# Patient Record
Sex: MALE | Race: WHITE | Employment: FULL TIME | URBAN - NONMETROPOLITAN AREA
[De-identification: names, ages, dates, MRNs, and addresses within clinical notes are randomized per-mention and may not be internally consistent; named-entity substitution may affect disease eponyms.]

---

## 2020-11-27 ENCOUNTER — OFFICE VISIT (OUTPATIENT)
Dept: PRIMARY CARE CLINIC | Age: 35
End: 2020-11-27
Payer: COMMERCIAL

## 2020-11-27 VITALS
TEMPERATURE: 97.9 F | WEIGHT: 220 LBS | DIASTOLIC BLOOD PRESSURE: 80 MMHG | BODY MASS INDEX: 32.58 KG/M2 | HEIGHT: 69 IN | RESPIRATION RATE: 18 BRPM | SYSTOLIC BLOOD PRESSURE: 120 MMHG | OXYGEN SATURATION: 97 % | HEART RATE: 100 BPM

## 2020-11-27 DIAGNOSIS — Z20.822 EXPOSURE TO COVID-19 VIRUS: ICD-10-CM

## 2020-11-27 PROCEDURE — 99214 OFFICE O/P EST MOD 30 MIN: CPT | Performed by: PHYSICIAN ASSISTANT

## 2020-11-27 RX ORDER — PREDNISONE 20 MG/1
40 TABLET ORAL DAILY
Qty: 10 TABLET | Refills: 0 | Status: SHIPPED | OUTPATIENT
Start: 2020-11-27 | End: 2020-12-02

## 2020-11-27 RX ORDER — BENZONATATE 100 MG/1
100 CAPSULE ORAL 3 TIMES DAILY PRN
Qty: 21 CAPSULE | Refills: 0 | Status: SHIPPED | OUTPATIENT
Start: 2020-11-27 | End: 2020-12-04

## 2020-11-27 RX ORDER — AZITHROMYCIN 250 MG/1
250 TABLET, FILM COATED ORAL SEE ADMIN INSTRUCTIONS
Qty: 6 TABLET | Refills: 0 | Status: SHIPPED | OUTPATIENT
Start: 2020-11-27 | End: 2020-12-02

## 2020-11-27 RX ORDER — CHLORPHENIRAMINE MALEATE 4 MG/1
4 TABLET ORAL EVERY 6 HOURS PRN
Qty: 20 TABLET | Refills: 0 | Status: SHIPPED
Start: 2020-11-27 | End: 2022-02-28

## 2020-11-27 NOTE — PROGRESS NOTES
20  Micaela Jefefry : 1985 Sex: male  Age 38 y.o. Subjective:  Chief Complaint   Patient presents with    Cough     cough, previous test unable to determine          HPI:   Micaela Jeffery , 38 y.o. male presents to express care for evaluation of cough, congestion, drainage. The patient has had a recent test on Monday at [facility] in [facility]. The patient states that it came back indeterminant. Patient still having cough, congestion. The patient started with the symptoms last Saturday. He said any back pain or flank pain. The patient is eating and drinking normally. The patient did lose his sense of taste that seems to be coming back at this point. ROS:   Unless otherwise stated in this report the patient's positive and negative responses for review of systems for constitutional, eyes, ENT, cardiovascular, respiratory, gastrointestinal, neurological, , musculoskeletal, and integument systems and related systems to the presenting problem are either stated in the history of present illness or were not pertinent or were negative for the symptoms and/or complaints related to the presenting medical problem. Positives and pertinent negatives as per HPI. All others reviewed and are negative. PMH:   No past medical history on file. Past Surgical History:   Procedure Laterality Date    BACK SURGERY         No family history on file. Medications:   No current outpatient medications on file. Allergies:   No Known Allergies    Social History:     Social History     Tobacco Use    Smoking status: Current Every Day Smoker     Packs/day: 0.50    Smokeless tobacco: Never Used   Substance Use Topics    Alcohol use: No    Drug use: No       Patient lives at home.     Physical Exam:     Vitals:    20 1017   BP: 120/80   Pulse: 100   Resp: 18   Temp: 97.9 °F (36.6 °C)   SpO2: 97%   Weight: 220 lb (99.8 kg)   Height: 5' 9\" (1.753 m)       Exam:  Physical Exam  Nurse's notes and vital signs reviewed. The patient is not hypoxic. ? General: Alert, no acute distress, patient resting comfortably Patient is not toxic or lethargic. Skin: Warm, intact, no pallor noted. There is no evidence of rash at this time. Head: Normocephalic, atraumatic  Eye: Normal conjunctiva  Ears, Nose, Throat: Right tympanic membrane clear, left tympanic membrane clear. No drainage or discharge noted. No pre- or post-auricular tenderness, erythema, or swelling noted. Mild congestion  Posterior oropharynx shows no erythema, tonsillar hypertrophy, or exudate. the uvula is midline. No trismus or drooling is noted. Moist mucous membranes. Neck: No anterior/posterior lymphadenopathy noted. No erythema, no masses, no fluctuance or induration noted. No meningeal signs. Cardiovascular: Regular Rate and Rhythm  Respiratory: No acute distress, dry cough throughout examination, no rhonchi, wheezing or crackles noted. No stridor or retractions are noted. Neurological: A&O x4, normal speech  Psychiatric: Cooperative         Testing:       Repeat Covid testing being performed    Medical Decision Making:     The patient has been seen and evaluated. The vital signs have been reviewed. The patient does not appear to be toxic or lethargic. The patient will have repeat Covid test performed. We will start the patient on a azithromycin, Tessalon, chlorphentermine and prednisone. Will the patient follow-up with PCP. Call with any questions or concerns. The patient was educated on the proper dosage of motrin and tylenol and the appropriate intervals of each. The patient is to increase fluid intake over the next several days. The patient is to use OTC decongestant as needed. The patient is to return to express care or go directly to the emergency department should any of the signs or symptoms worsen. The patient is to followup with primary care physician in 2-3 days for repeat evaluation.  The

## 2020-11-29 LAB
SARS-COV-2: NOT DETECTED
SOURCE: NORMAL

## 2021-03-02 ENCOUNTER — HOSPITAL ENCOUNTER (OUTPATIENT)
Age: 36
Discharge: HOME | End: 2021-03-04

## 2021-03-02 ENCOUNTER — HOSPITAL ENCOUNTER (OUTPATIENT)
Dept: GENERAL RADIOLOGY | Age: 36
Discharge: HOME | End: 2021-03-04

## 2021-03-02 DIAGNOSIS — R06.02 SOB (SHORTNESS OF BREATH): ICD-10-CM

## 2021-03-02 LAB
BASOPHILS ABSOLUTE: 0.04 E9/L (ref 0–0.2)
BASOPHILS RELATIVE PERCENT: 0.7 % (ref 0–2)
BILIRUBIN URINE: NEGATIVE
BLOOD, URINE: NEGATIVE
CLARITY: CLEAR
COLOR: YELLOW
EOSINOPHILS ABSOLUTE: 0.08 E9/L (ref 0.05–0.5)
EOSINOPHILS RELATIVE PERCENT: 1.5 % (ref 0–6)
GLUCOSE URINE: NEGATIVE MG/DL
HCT VFR BLD CALC: 51.8 % (ref 37–54)
HEMOGLOBIN: 17 G/DL (ref 12.5–16.5)
IMMATURE GRANULOCYTES #: 0.02 E9/L
IMMATURE GRANULOCYTES %: 0.4 % (ref 0–5)
KETONES, URINE: NEGATIVE MG/DL
LEUKOCYTE ESTERASE, URINE: NEGATIVE
LYMPHOCYTES ABSOLUTE: 1.63 E9/L (ref 1.5–4)
LYMPHOCYTES RELATIVE PERCENT: 29.7 % (ref 20–42)
MCH RBC QN AUTO: 28.8 PG (ref 26–35)
MCHC RBC AUTO-ENTMCNC: 32.8 % (ref 32–34.5)
MCV RBC AUTO: 87.8 FL (ref 80–99.9)
MONOCYTES ABSOLUTE: 0.36 E9/L (ref 0.1–0.95)
MONOCYTES RELATIVE PERCENT: 6.6 % (ref 2–12)
NEUTROPHILS ABSOLUTE: 3.36 E9/L (ref 1.8–7.3)
NEUTROPHILS RELATIVE PERCENT: 61.1 % (ref 43–80)
NITRITE, URINE: NEGATIVE
PDW BLD-RTO: 11.9 FL (ref 11.5–15)
PH UA: 6 (ref 5–9)
PLATELET # BLD: 247 E9/L (ref 130–450)
PMV BLD AUTO: 10.3 FL (ref 7–12)
PROTEIN UA: NEGATIVE MG/DL
RBC # BLD: 5.9 E12/L (ref 3.8–5.8)
SPECIFIC GRAVITY UA: 1.02 (ref 1–1.03)
UROBILINOGEN, URINE: 0.2 E.U./DL
WBC # BLD: 5.5 E9/L (ref 4.5–11.5)

## 2021-03-02 PROCEDURE — 71046 X-RAY EXAM CHEST 2 VIEWS: CPT

## 2022-01-20 LAB
ALBUMIN SERPL-MCNC: 4.6 G/DL
ALP BLD-CCNC: 165 U/L
ALT SERPL-CCNC: 151 U/L
ANION GAP SERPL CALCULATED.3IONS-SCNC: NORMAL MMOL/L
AST SERPL-CCNC: 63 U/L
AVERAGE GLUCOSE: NORMAL
BASOPHILS ABSOLUTE: 31 /ΜL
BASOPHILS RELATIVE PERCENT: 0.6 %
BILIRUB SERPL-MCNC: 0.8 MG/DL (ref 0.1–1.4)
BUN BLDV-MCNC: 16 MG/DL
CALCIUM SERPL-MCNC: 9.7 MG/DL
CHLORIDE BLD-SCNC: 102 MMOL/L
CHOLESTEROL, TOTAL: 268 MG/DL
CHOLESTEROL/HDL RATIO: 4.3
CO2: 27 MMOL/L
CREAT SERPL-MCNC: 1.02 MG/DL
EOSINOPHILS ABSOLUTE: 92 /ΜL
EOSINOPHILS RELATIVE PERCENT: 1.8 %
GFR CALCULATED: NORMAL
GLUCOSE BLD-MCNC: 93 MG/DL
HBA1C MFR BLD: 6 %
HCT VFR BLD CALC: 48.2 % (ref 41–53)
HDLC SERPL-MCNC: 63 MG/DL (ref 35–70)
HEMOGLOBIN: 16.2 G/DL (ref 13.5–17.5)
LDL CHOLESTEROL CALCULATED: 182 MG/DL (ref 0–160)
LYMPHOCYTES ABSOLUTE: 1805 /ΜL
LYMPHOCYTES RELATIVE PERCENT: 35.4 %
MCH RBC QN AUTO: 29.2 PG
MCHC RBC AUTO-ENTMCNC: 33.6 G/DL
MCV RBC AUTO: 86.8 FL
MONOCYTES ABSOLUTE: 398 /ΜL
MONOCYTES RELATIVE PERCENT: 7.8 %
NEUTROPHILS ABSOLUTE: 2774 /ΜL
NEUTROPHILS RELATIVE PERCENT: 54.4 %
NONHDLC SERPL-MCNC: ABNORMAL MG/DL
PLATELET # BLD: 212 K/ΜL
PMV BLD AUTO: 10.6 FL
POTASSIUM SERPL-SCNC: 4.4 MMOL/L
RBC # BLD: 5.55 10^6/ΜL
SODIUM BLD-SCNC: 136 MMOL/L
TOTAL PROTEIN: 7.2
TRIGL SERPL-MCNC: 105 MG/DL
VLDLC SERPL CALC-MCNC: ABNORMAL MG/DL
WBC # BLD: 5.1 10^3/ML

## 2022-02-28 ENCOUNTER — OFFICE VISIT (OUTPATIENT)
Dept: PRIMARY CARE CLINIC | Age: 37
End: 2022-02-28
Payer: COMMERCIAL

## 2022-02-28 VITALS
HEART RATE: 67 BPM | HEIGHT: 69 IN | DIASTOLIC BLOOD PRESSURE: 70 MMHG | OXYGEN SATURATION: 98 % | SYSTOLIC BLOOD PRESSURE: 134 MMHG | TEMPERATURE: 97.8 F | BODY MASS INDEX: 29.92 KG/M2 | WEIGHT: 202 LBS

## 2022-02-28 DIAGNOSIS — Z11.4 ENCOUNTER FOR SCREENING FOR HIV: ICD-10-CM

## 2022-02-28 DIAGNOSIS — R79.89 ELEVATED LFTS: Primary | ICD-10-CM

## 2022-02-28 DIAGNOSIS — Z00.00 HEALTH MAINTENANCE EXAMINATION: ICD-10-CM

## 2022-02-28 DIAGNOSIS — R82.89 ABNORMAL URINE CYTOLOGY: ICD-10-CM

## 2022-02-28 DIAGNOSIS — E78.2 MIXED HYPERLIPIDEMIA: ICD-10-CM

## 2022-02-28 DIAGNOSIS — R73.03 PREDIABETES: ICD-10-CM

## 2022-02-28 DIAGNOSIS — D41.02 NEOPLASM OF UNCERTAIN BEHAVIOR OF LEFT KIDNEY: ICD-10-CM

## 2022-02-28 PROCEDURE — 99215 OFFICE O/P EST HI 40 MIN: CPT | Performed by: FAMILY MEDICINE

## 2022-02-28 SDOH — ECONOMIC STABILITY: FOOD INSECURITY: WITHIN THE PAST 12 MONTHS, YOU WORRIED THAT YOUR FOOD WOULD RUN OUT BEFORE YOU GOT MONEY TO BUY MORE.: NEVER TRUE

## 2022-02-28 SDOH — ECONOMIC STABILITY: FOOD INSECURITY: WITHIN THE PAST 12 MONTHS, THE FOOD YOU BOUGHT JUST DIDN'T LAST AND YOU DIDN'T HAVE MONEY TO GET MORE.: NEVER TRUE

## 2022-02-28 ASSESSMENT — PATIENT HEALTH QUESTIONNAIRE - PHQ9
2. FEELING DOWN, DEPRESSED OR HOPELESS: 0
1. LITTLE INTEREST OR PLEASURE IN DOING THINGS: 0
SUM OF ALL RESPONSES TO PHQ QUESTIONS 1-9: 0
SUM OF ALL RESPONSES TO PHQ9 QUESTIONS 1 & 2: 0
SUM OF ALL RESPONSES TO PHQ QUESTIONS 1-9: 0

## 2022-02-28 ASSESSMENT — SOCIAL DETERMINANTS OF HEALTH (SDOH): HOW HARD IS IT FOR YOU TO PAY FOR THE VERY BASICS LIKE FOOD, HOUSING, MEDICAL CARE, AND HEATING?: NOT HARD AT ALL

## 2022-02-28 NOTE — PROGRESS NOTES
Imelda Pro : 1985 Sex: male  Age: 43 y.o. Chief Complaint   Patient presents with    Discuss Labs       HPI  HPI:    Patient presents today as he received some abnormal results from a work physical, they checked a chest x-ray blood work and urine cytology. Results as below. He is trying to lose  Weight, lost 25 lbs over a year (at new company), all explained he states. Feels very well. Family history of prostate cancer in maternal grandfather later in life but no other history of urinary tract cancers  Had recent nL CXR, elevated chol, lft, a1c, atypical urothelia cells,     [id number], HDL 63, , , AST 63, , A1C 6.0, , CBC nL, UA NEG, , TOTAL IRON 156, atypical urothelial cells        Review of Systems  ROS:  Const: Denies chills, fever, malaise and sweats. Eyes: Denies discharge, pain, redness and visual disturbance. ENMT: Denies earaches, other ear symptoms. Denies nasal or sinus symptoms other than stated  above. Denies mouth and tongue lesions and sore throat. CV: Denies chest discomfort, pain; diaphoresis, dizziness, edema, lightheadedness, orthopnea,  palpitations, syncope and near syncopal episode or any exertional symptoms  Resp: Denies cough, hemoptysis, pleuritic pain, SOB, sputum production and wheezing. GI: Denies abdominal pain, change in bowel habits, hematochezia, melena, nausea and vomiting. : Denies urinary symptoms including dysuria , urgency, frequency or hematuria. Musculo: Denies musculoskeletal symptoms. Skin: Denies bruising and rash. Neuro: Denies headache, numbness, stiff neck, tingling and focal weakness slurred speech or facial  droop  Hema/Lymph: Denies bleeding/bruising tendency and enlarged lymph nodes      No current outpatient medications on file.   No Known Allergies    Past Medical History:   Diagnosis Date    Patient denies medical problems      Past Surgical History:   Procedure Laterality Date    BACK SURGERY   Discectomy; Dr Winsome Wen     Family History   Problem Relation Age of Onset    Prostate Cancer Maternal Grandfather          age 80, dx 80's    Leukemia Paternal Grandfather         around age [de-identified]     Social History     Tobacco Use    Smoking status: Former Smoker     Packs/day: 0.50     Start date:      Quit date: 2017     Years since quittin.1    Smokeless tobacco: Never Used   Substance Use Topics    Alcohol use: No    Drug use: No      Social History     Social History Narrative     [employer]    (Gets yrly cxr, bw, occ pfts)          - works [employer]    1 daughter age 3 as of         Vitals:    22 0812   BP: 134/70   Pulse: 67   Temp: 97.8 °F (36.6 °C)   SpO2: 98%   Weight: 202 lb (91.6 kg)   Height: 5' 9\" (1.753 m)      Wt Readings from Last 3 Encounters:   22 202 lb (91.6 kg)   20 220 lb (99.8 kg)        Physical Exam  Exam:  Const: Appears comfortable. No signs of acute distress present. Head/Face: Atraumatic on inspection. Eyes: EOMI in both eyes. No discharge from the eyes. PERRL. Sclerae clear. ENMT: Auditory canals normal. Tympanic membranes: intact and translucent. External nose WNL. Nasal mucosa is clear. Oropharynx: No erythema or exudate. Posterior pharynx is normal.  Neck: Supple. Palpation reveals no adenopathy. No masses appreciated. No JVD. Carotids: no  bruits. Resp: Respirations are unlabored. Clear to auscultation. No rales, rhonchi or wheezes appreciated  over the lungs bilaterally. CV: Rate is regular. Rhythm is regular. No gallop or rubs. No heart murmur appreciated. Extremities: No clubbing, cyanosis, or edema. No calf inflammation or tenderness. Abdomen: Bowel sounds are normoactive. Abdomen is soft, nontender, and nondistended. No  abdominal masses. No palpable hepatosplenomegaly. Lymph: No palpable or visible regional lymphadenopathy. Musculoskeletal: no acute joint inflammation.   Skin: Dry and warm with no rash. Skin normal to inspection and palpation overall. Neuro: Alert and oriented. Affect: appropriate. Upper Extremities: 5/5 bilaterally. Lower Extremities:  5/5 bilaterally. Sensation intact to light touch. Reflexes: DTR's are symmetric and 2+ bilaterally. .  Cranial Nerves: Cranial nerves grossly intact. Office Labs This Visit :  No results found for this visit on 02/28/22. Assessment and Plan:   Diagnosis Orders   1. Elevated LFTs  US ABDOMEN COMPLETE    Hepatitis C Antibody    Hepatitis B Surface Antigen    Hepatitis B Surface Antibody    Hepatitis A Antibody, Total    Hepatitis A Antibody, IgM    Comprehensive Metabolic Panel    TSH   2. Prediabetes  Hemoglobin A1C    Comprehensive Metabolic Panel    Microalbumin / Creatinine Urine Ratio   3. Abnormal urine cytology  US ABDOMEN COMPLETE    Urinalysis    Cytology, Non-Gyn    Amb External Referral To Urology   4. Health maintenance examination     5. Encounter for screening for HIV  HIV Screen   6. Mixed hyperlipidemia  Lipid Panel    TSH       Elevated LFTs    Counseled extensively. Differential reviewed, including serious etiologies. Consider fatty liver. Precautions reviewed. Risks of even fatty liverreviewed. Lifestyle modification and appropriate diet and weight loss reviewed. Asymptomatic. Start with repeat blood work, hepatitis screen and ultrasound. Lifestyle modification. May need CT/referral    Prediabetes    counseled, consider insulin sensitizer, lifestyle modification, repeat    Abnormal urine cytology    Counseled extensively. Differential reviewed, including serious etiologies. Prior history of smoking and history of benzene exposure but no family history or other risk factors, asymptomatic and young age. No microscopic blood on urinalysis or gross hematuria. Consider false positive but need to thoroughly evaluate. Start with ultrasound and referral to Dr. Cathie Cardozo with repeat urine cytology/urinalysis. May need CTU/cystoscopy and counseled, Notify of symptoms    Health maintenance examination    encouraged    Mixed hyperlipidemia    counseled, consider statin but we have to assess elevated LFTs first.  Lifestyle modification reviewed. Recheck         No flowsheet data found. Plan as above. Counseled extensively and differential diagnoses relevant to above were reviewed, including serious etiologies. Side effects and interactions of medications were reviewed. Check ultrasound, repeat blood work, lifestyle modification, refer to Dr. Angelica Thomason, consider CT. Follow-up 1 to 2 weeks sooner as needed        As long as symptoms steadily improve/resolve, and medical conditions follow the expected course, FU as below, sooner PRN. Return for 1-2 wks, physical.       Over 40 minutes  spent with the patient in reviewing records, reviewing with patient/family, counseling, ordering,  prescribing, completing h&p, etc., with over 50% of the time spent face to face counseling. Signs and symptoms to watch for discussed, serious signs and symptoms reviewed. ER if any. Ursula Bustamante MD    Patients are advised to check with insurance company to ensure coverage and to fully understand benefits and cost prior to any testing. This note was created with the assistance of voice recognition software. Document was reviewed however may contain grammatical errors.

## 2022-02-28 NOTE — ASSESSMENT & PLAN NOTE
Counseled extensively. Differential reviewed, including serious etiologies. Consider fatty liver. Precautions reviewed. Risks of even fatty liverreviewed. Lifestyle modification and appropriate diet and weight loss reviewed. Asymptomatic. Start with repeat blood work, hepatitis screen and ultrasound. Lifestyle modification.   May need CT/referral

## 2022-02-28 NOTE — ASSESSMENT & PLAN NOTE
Counseled extensively. Differential reviewed, including serious etiologies. Prior history of smoking and history of benzene exposure but no family history or other risk factors, asymptomatic and young age. No microscopic blood on urinalysis or gross hematuria. Consider false positive but need to thoroughly evaluate. Start with ultrasound and referral to Dr. Do Seymour with repeat urine cytology/urinalysis.   May need CTU/cystoscopy and counseled, Notify of symptoms

## 2022-02-28 NOTE — ASSESSMENT & PLAN NOTE
counseled, consider statin but we have to assess elevated LFTs first.  Lifestyle modification reviewed.   Recheck

## 2022-03-01 DIAGNOSIS — E78.2 MIXED HYPERLIPIDEMIA: ICD-10-CM

## 2022-03-01 DIAGNOSIS — R82.89 ABNORMAL URINE CYTOLOGY: ICD-10-CM

## 2022-03-01 DIAGNOSIS — R79.89 ELEVATED LFTS: ICD-10-CM

## 2022-03-01 DIAGNOSIS — R73.03 PREDIABETES: ICD-10-CM

## 2022-03-01 DIAGNOSIS — Z11.4 ENCOUNTER FOR SCREENING FOR HIV: ICD-10-CM

## 2022-03-01 LAB
ALBUMIN SERPL-MCNC: 4.3 G/DL (ref 3.5–5.2)
ALP BLD-CCNC: 148 U/L (ref 40–129)
ALT SERPL-CCNC: 146 U/L (ref 0–40)
ANION GAP SERPL CALCULATED.3IONS-SCNC: 11 MMOL/L (ref 7–16)
AST SERPL-CCNC: 75 U/L (ref 0–39)
BACTERIA: NORMAL /HPF
BILIRUB SERPL-MCNC: 0.6 MG/DL (ref 0–1.2)
BILIRUBIN URINE: NEGATIVE
BLOOD, URINE: NORMAL
BUN BLDV-MCNC: 20 MG/DL (ref 6–20)
CALCIUM SERPL-MCNC: 9.7 MG/DL (ref 8.6–10.2)
CHLORIDE BLD-SCNC: 100 MMOL/L (ref 98–107)
CHOLESTEROL, TOTAL: 247 MG/DL (ref 0–199)
CLARITY: CLEAR
CO2: 26 MMOL/L (ref 22–29)
COLOR: YELLOW
CREAT SERPL-MCNC: 1.1 MG/DL (ref 0.7–1.2)
CREATININE URINE: 39 MG/DL (ref 40–278)
GFR AFRICAN AMERICAN: >60
GFR NON-AFRICAN AMERICAN: >60 ML/MIN/1.73
GLUCOSE BLD-MCNC: 94 MG/DL (ref 74–99)
GLUCOSE URINE: NEGATIVE MG/DL
HBA1C MFR BLD: 5.9 % (ref 4–5.6)
HDLC SERPL-MCNC: 59 MG/DL
KETONES, URINE: NEGATIVE MG/DL
LDL CHOLESTEROL CALCULATED: 156 MG/DL (ref 0–99)
LEUKOCYTE ESTERASE, URINE: NEGATIVE
MICROALBUMIN UR-MCNC: <12 MG/L
MICROALBUMIN/CREAT UR-RTO: ABNORMAL (ref 0–30)
NITRITE, URINE: NEGATIVE
PH UA: 5.5 (ref 5–9)
POTASSIUM SERPL-SCNC: 4.8 MMOL/L (ref 3.5–5)
PROTEIN UA: NEGATIVE MG/DL
RBC UA: NORMAL /HPF (ref 0–2)
SODIUM BLD-SCNC: 137 MMOL/L (ref 132–146)
SPECIFIC GRAVITY UA: <=1.005 (ref 1–1.03)
TOTAL PROTEIN: 7.1 G/DL (ref 6.4–8.3)
TRIGL SERPL-MCNC: 161 MG/DL (ref 0–149)
TSH SERPL DL<=0.05 MIU/L-ACNC: 1.03 UIU/ML (ref 0.27–4.2)
UROBILINOGEN, URINE: 0.2 E.U./DL
VLDLC SERPL CALC-MCNC: 32 MG/DL
WBC UA: NORMAL /HPF (ref 0–5)

## 2022-03-01 NOTE — RESULT ENCOUNTER NOTE
Blood work reviewed,Liver enzymes remain elevated, although similar to previous. Await ultrasound. Continue to encourage avoidance of hepatotoxic agents including no alcohol, and encouraging low-fat low-carb diet, avoid Tylenol etc. to be safe I would like him to see a specialist, ask if he has any preferences. Options would be Dr. Jory Butt, Dr Lm Stephens, dr Jules Vega or dr Azeem Power [facility].  Keep follow-up with me to recheck, sooner as needed

## 2022-03-02 DIAGNOSIS — R79.89 ELEVATED LFTS: Primary | ICD-10-CM

## 2022-03-02 LAB
HAV IGM SER IA-ACNC: NORMAL
HBV SURFACE AB TITR SER: NORMAL {TITER}
HEPATITIS B SURFACE ANTIGEN INTERPRETATION: NORMAL
HEPATITIS C ANTIBODY INTERPRETATION: NORMAL
HIV-1 AND HIV-2 ANTIBODIES: NORMAL

## 2022-03-04 LAB — HAV AB SERPL IA-ACNC: NEGATIVE

## 2022-03-08 ENCOUNTER — OFFICE VISIT (OUTPATIENT)
Dept: GASTROENTEROLOGY | Age: 37
End: 2022-03-08
Payer: COMMERCIAL

## 2022-03-08 ENCOUNTER — TELEPHONE (OUTPATIENT)
Dept: PRIMARY CARE CLINIC | Age: 37
End: 2022-03-08

## 2022-03-08 VITALS
WEIGHT: 200 LBS | SYSTOLIC BLOOD PRESSURE: 147 MMHG | HEIGHT: 69 IN | BODY MASS INDEX: 29.62 KG/M2 | HEART RATE: 68 BPM | DIASTOLIC BLOOD PRESSURE: 95 MMHG

## 2022-03-08 DIAGNOSIS — R74.8 ELEVATED LIVER ENZYMES: ICD-10-CM

## 2022-03-08 DIAGNOSIS — R74.8 ELEVATED LIVER ENZYMES: Primary | ICD-10-CM

## 2022-03-08 LAB
ALBUMIN SERPL-MCNC: 4.4 G/DL (ref 3.5–5.2)
ALP BLD-CCNC: 140 U/L (ref 40–129)
ALT SERPL-CCNC: 122 U/L (ref 0–40)
AST SERPL-CCNC: 57 U/L (ref 0–39)
BASOPHILS ABSOLUTE: 0.04 E9/L (ref 0–0.2)
BASOPHILS RELATIVE PERCENT: 0.8 % (ref 0–2)
BILIRUB SERPL-MCNC: 0.8 MG/DL (ref 0–1.2)
BILIRUBIN DIRECT: 0.2 MG/DL (ref 0–0.3)
BILIRUBIN, INDIRECT: 0.6 MG/DL (ref 0–1)
EOSINOPHILS ABSOLUTE: 0.19 E9/L (ref 0.05–0.5)
EOSINOPHILS RELATIVE PERCENT: 3.7 % (ref 0–6)
GAMMA GLUTAMYL TRANSFERASE: 603 U/L (ref 10–71)
HCT VFR BLD CALC: 49.7 % (ref 37–54)
HEMOGLOBIN: 16.1 G/DL (ref 12.5–16.5)
IMMATURE GRANULOCYTES #: 0.02 E9/L
IMMATURE GRANULOCYTES %: 0.4 % (ref 0–5)
INR BLD: 1
IRON SATURATION: 62 % (ref 20–55)
IRON: 192 MCG/DL (ref 59–158)
LYMPHOCYTES ABSOLUTE: 1.87 E9/L (ref 1.5–4)
LYMPHOCYTES RELATIVE PERCENT: 36.5 % (ref 20–42)
MCH RBC QN AUTO: 29.1 PG (ref 26–35)
MCHC RBC AUTO-ENTMCNC: 32.4 % (ref 32–34.5)
MCV RBC AUTO: 89.7 FL (ref 80–99.9)
MONOCYTES ABSOLUTE: 0.39 E9/L (ref 0.1–0.95)
MONOCYTES RELATIVE PERCENT: 7.6 % (ref 2–12)
NEUTROPHILS ABSOLUTE: 2.61 E9/L (ref 1.8–7.3)
NEUTROPHILS RELATIVE PERCENT: 51 % (ref 43–80)
PDW BLD-RTO: 12.1 FL (ref 11.5–15)
PLATELET # BLD: 233 E9/L (ref 130–450)
PMV BLD AUTO: 10.2 FL (ref 7–12)
PROTHROMBIN TIME: 11 SEC (ref 9.3–12.4)
RBC # BLD: 5.54 E12/L (ref 3.8–5.8)
TOTAL IRON BINDING CAPACITY: 312 MCG/DL (ref 250–450)
TOTAL PROTEIN: 7.5 G/DL (ref 6.4–8.3)
WBC # BLD: 5.1 E9/L (ref 4.5–11.5)

## 2022-03-08 PROCEDURE — 99202 OFFICE O/P NEW SF 15 MIN: CPT | Performed by: NURSE PRACTITIONER

## 2022-03-08 NOTE — TELEPHONE ENCOUNTER
----- Message from Ivette Russell MD sent at 3/8/2022 11:31 AM EST -----  Please facilitate, thank you

## 2022-03-08 NOTE — TELEPHONE ENCOUNTER
3/8/22 notified patient by phone of ct order. He was given the number to call [facility] to set up the appt.  He also said he had others order he needed to do at the hospital as well

## 2022-03-08 NOTE — PATIENT INSTRUCTIONS
Patient Education        Learning About Low-Fat Eating  What is low-fat eating? Most food has some fat in it. Your body needs some fat to be healthy. But some kinds of fats are healthier than others. In a low-fat eating plan, you try to choose healthier fats and eat fewer unhealthy fats. Healthy fats include olive and canola oil. Try to avoid eating too much saturated fat, such as in cheese and meats. You do not need to cut all fat from your diet. But you can make healthier choices about the types and amount of fat you eat. Even though it is a good idea to choose healthier fats, it is still important to be careful of how much fat you eat, because all fats are high in calories. What are the different types of fats? Unhealthy fat  · Saturated fat. Saturated fats are mostly in animal foods, such as meat and dairy foods. Tropical oils, such as coconut oil, palm oil, and cocoa butter, are also saturated fats. Healthy fats  · Monounsaturated fat. Monounsaturated fats are liquid at room temperature but get solid when refrigerated. Eating foods that are high in this fat may help lower your \"bad\" (LDL) cholesterol, keep your \"good\" (HDL) cholesterol level up, and lower your chances of getting coronary artery disease. This fat is found in canola oil, olive oil, peanut oil, olives, avocados, nuts, and nut butters. · Polyunsaturated fat. Polyunsaturated fats are liquid at room temperature. They are in safflower, sunflower, and corn oils. They are also the main fat in seafood. Omega-3 fatty acids are types of polyunsaturated fat. Eating fish may lower your chances of getting coronary artery disease. Fatty fish such as salmon and mackerel contain these healthy fatty acids. So do ground flaxseeds and flaxseed oil, soybeans, walnuts, and seeds. Why cut down on unhealthy fats? Eating foods that contain saturated fats can raise the LDL (\"bad\") cholesterol in your blood.  Having a high level of LDL cholesterol increases your chance of hardening of the arteries (atherosclerosis), which can lead to heart disease, heart attack, and stroke. In general:  · No more than 10% of your daily calories should come from saturated fat. This is about 20 grams in a 2,000-calorie diet. · No more than 10% of your daily calories should come from polyunsaturated fat. This is about 20 grams in a 2,000-calorie diet. · Monounsaturated fats can be up to 15% of your daily calories. This is about 25 to 30 grams in a 2,000-calorie diet. If you're not sure how much fat you should be eating or how many calories you need each day to stay at a healthy weight, talk to a registered dietitian. A dietitian can help you create a plan that's right for you. What can you do to cut down on fat? Foods like cheese, butter, sausage, and desserts can have a lot of unhealthy fats. Try these tips for healthier meals at home and when you eat out. At home  · Fill up on fruits, vegetables, and whole grains. · Think of meat as a side dish instead of as the main part of your meal.  · When you do eat meat, make it extra-lean ground beef (97% lean), ground turkey breast (without skin added), meats with fat trimmed off before cooking, or skinless chicken. · Try main dishes that use whole wheat pasta, brown rice, dried beans, or vegetables. · Use cooking methods that use little or no fat, such as broiling, steaming, or grilling. Use cooking spray instead of oil. If you use oil, use a monounsaturated oil, such as canola or olive oil. · Read food labels on canned, bottled, or packaged foods. Choose those with little saturated fat. When eating out at a restaurant  · Order foods that are broiled or poached instead of fried or breaded. · Cut back on the amount of butter or margarine that you use on bread. Use small amounts of olive oil instead. · Order sauces, gravies, and salad dressings on the side, and use only a little.   · When you order pasta, choose tomato sauce instead of cream sauce. · Ask for salsa with your baked potato instead of sour cream, butter, cheese, or mei. Where can you learn more? Go to https://[employer].ThoughtLeadr. org and sign in to your [employer] account. Enter V078 in the [facility] box to learn more about \"Learning About Low-Fat Eating. \"     If you do not have an account, please click on the \"Sign Up Now\" link. Current as of: September 8, 2021               Content Version: 13.1  © 6467-6326 Healthwise, Incorporated. Care instructions adapted under license by [facility] ([facility]). If you have questions about a medical condition or this instruction, always ask your healthcare professional. [address] any warranty or liability for your use of this information.

## 2022-03-08 NOTE — PROGRESS NOTES
Imelda Kc (:  1985) is a 43 y.o. male, here for evaluation of the following chief complaint(s):  Elevated Hepatic Enzymes (ref from dr Max Harris for elevated liver enzymes)      SUBJECTIVE/OBJECTIVE:  HPI:    Lisa Meier is a very pleasant 43year old gentleman that presents today for elevated liver chemistries. Labs from 3/1/22 noted an alkaline phosphatase of 140, ALT of 145, and AST of 75. Cholesterol level was 247 with triglycerides of 161. Hepatitis panel negative and HIV negative. Ultrasound 3/7/21 noted   normal echogenicity without evidence of   intrahepatic biliary ductal dilatation. No family history of liver disease, drinks alcohol on occasion, no history of IV drug use, no tattoos, no OTC supplements, no routine NSAID or tylenol use, no recent travel, and no prescription medications. Denies yellowing of the skin or eye, pruritis, abdominal pain, or brain fog. Patient has lost 20 lbs since starting a new job. He has changed his diet since learning his cholesterol levels. ROS:  General: Patient denies n/v/f/c or weight loss. HEENT: Patient denies persistent postnasal drip, scleral icterus, drooling, persistent bleeding from nose/mouth. Resp: Patient denies SOB, wheezing, productive cough. Cards: Patient denies CP, palpitations, significant edema  GI: As above. Derm: Patient denies jaundice/rashes. Musc: Patient denies diffuse/irregular joint swelling or myalgias.       Objective   Wt Readings from Last 3 Encounters:   22 200 lb (90.7 kg)   22 202 lb (91.6 kg)   20 220 lb (99.8 kg)     Temp Readings from Last 3 Encounters:   22 97.8 °F (36.6 °C)   20 97.9 °F (36.6 °C)     BP Readings from Last 3 Encounters:   22 (!) 147/95   22 134/70   20 120/80     Pulse Readings from Last 3 Encounters:   22 68   22 67   20 100        Physical Exam  Abdominal:      General: Bowel sounds are normal.      Palpations: Abdomen is soft. Past Medical History:   Diagnosis Date    Patient denies medical problems       Past Surgical History:   Procedure Laterality Date    BACK SURGERY  2010    Discectomy; Dr Juancarlos Power      Family History   Problem Relation Age of Onset    Prostate Cancer Father     Prostate Cancer Maternal Grandfather          age 80, dx 80's    Leukemia Paternal Grandfather         around age [de-identified]        Lab Results   Component Value Date    WBC 5.1 2022    HGB 16.2 2022    HCT 48.2 2022    MCV 86.8 2022     2022      Lab Results   Component Value Date     2022    K 4.8 2022     2022    CO2 26 2022    BUN 20 2022    CREATININE 1.1 2022    GLUCOSE 94 2022    CALCIUM 9.7 2022    PROT 7.1 2022    LABALBU 4.3 2022    BILITOT 0.6 2022    ALKPHOS 148 (H) 2022    AST 75 (H) 2022     (H) 2022    LABGLOM >60 2022    GFRAA >60 2022                       ASSESSMENT/PLAN:    1. Elevated liver enzymes  -     CBC with Auto Differential; Future  -     Hepatic Function Panel; Future  -     Protime-INR; Future  -     Iron and TIBC; Future  -     Smooth Muscle Antibody Quant; Future  -     Hepatitis A Antibody, Total; Future  -     Ceruloplasmin; Future  -     Gamma GT; Future  -     Antimitochondrial antibody; Future      Return for Follow up in 4 weeks. An electronic signature was used to authenticate this note.     --Mariely Hsu, APRN - CNP

## 2022-03-09 ENCOUNTER — HOSPITAL ENCOUNTER (OUTPATIENT)
Age: 37
Discharge: HOME | End: 2022-03-09
Payer: COMMERCIAL

## 2022-03-09 LAB
ANTI-MITOCHONDRIAL AB, IFA: NEGATIVE
SMOOTH MUSCLE ANTIBODY: NEGATIVE

## 2022-03-09 PROCEDURE — 88112 CYTOPATH CELL ENHANCE TECH: CPT

## 2022-03-10 LAB
CERULOPLASMIN: 25 MG/DL (ref 15–30)
HAV AB SERPL IA-ACNC: NEGATIVE

## 2022-03-14 ENCOUNTER — OFFICE VISIT (OUTPATIENT)
Dept: PRIMARY CARE CLINIC | Age: 37
End: 2022-03-14
Payer: COMMERCIAL

## 2022-03-14 VITALS
HEART RATE: 64 BPM | BODY MASS INDEX: 29.39 KG/M2 | TEMPERATURE: 97.8 F | SYSTOLIC BLOOD PRESSURE: 120 MMHG | DIASTOLIC BLOOD PRESSURE: 64 MMHG | OXYGEN SATURATION: 98 % | WEIGHT: 199 LBS

## 2022-03-14 DIAGNOSIS — R31.21 ASYMPTOMATIC MICROSCOPIC HEMATURIA: ICD-10-CM

## 2022-03-14 DIAGNOSIS — Z00.00 HEALTH MAINTENANCE EXAMINATION: ICD-10-CM

## 2022-03-14 DIAGNOSIS — R79.0 ABNORMAL SERUM IRON LEVEL: ICD-10-CM

## 2022-03-14 DIAGNOSIS — R79.89 ELEVATED LFTS: ICD-10-CM

## 2022-03-14 DIAGNOSIS — D41.02 NEOPLASM OF UNCERTAIN BEHAVIOR OF LEFT KIDNEY: Primary | ICD-10-CM

## 2022-03-14 DIAGNOSIS — R73.03 PREDIABETES: ICD-10-CM

## 2022-03-14 DIAGNOSIS — E78.2 MIXED HYPERLIPIDEMIA: ICD-10-CM

## 2022-03-14 DIAGNOSIS — R82.89 ABNORMAL URINE CYTOLOGY: ICD-10-CM

## 2022-03-14 DIAGNOSIS — Z23 NEED FOR HEPATITIS A AND B VACCINATION: ICD-10-CM

## 2022-03-14 PROCEDURE — 90472 IMMUNIZATION ADMIN EACH ADD: CPT | Performed by: FAMILY MEDICINE

## 2022-03-14 PROCEDURE — 90632 HEPA VACCINE ADULT IM: CPT | Performed by: FAMILY MEDICINE

## 2022-03-14 PROCEDURE — 90471 IMMUNIZATION ADMIN: CPT | Performed by: FAMILY MEDICINE

## 2022-03-14 PROCEDURE — 90746 HEPB VACCINE 3 DOSE ADULT IM: CPT | Performed by: FAMILY MEDICINE

## 2022-03-14 PROCEDURE — 99213 OFFICE O/P EST LOW 20 MIN: CPT | Performed by: FAMILY MEDICINE

## 2022-03-14 NOTE — ASSESSMENT & PLAN NOTE
Counseled extensively. Differential reviewed, including serious etiologies. Ultrasound complete. Await CTU. Await urology.   Initial cytology abnormal repeat normal

## 2022-03-14 NOTE — ASSESSMENT & PLAN NOTE
counseled, ordered by and continue per GI. Check ferritin with next blood work. Recommended blood donation in the meantime.

## 2022-03-14 NOTE — PROGRESS NOTES
Krysten Rogers : 1985 Sex: male  Age: 43 y.o. Chief Complaint   Patient presents with    Discuss Labs    Other     Follow-up multiple       HPI  HPI:        Patient presents today to follow-up on multiple studies. He feels well. Purposely losing weight. Admits to unhealthy diet/lifestyle prior to recent changes. Several abnormalities on work physicalsee previous note. He is follow-up today on blood work that we ordered  As well as chest x-ray and abdominal ultrasound. He has since seen gastroenterology who ordered additional studies and he will follow-up with them soon. Their studies did include iron and iron saturation that was somewhat elevated, INR 1.0, autoimmune studies negative, CBC with differential normal.  Blood work for me showed normal BMP, ALT seems to be trending down somewhat from 146-122, AST 75-57, alk phos 1481 40, ceruloplamim for GI negative, hemoglobin A1c 5.9, glucose 94, TSH 1.030, HIV negative, hepatitis A/B/C- with not immunity and willing to start vaccine series.   Chest x-ray negative, urinalysis showed trace blood, cytology negative, ultrasound showed question of soft tissue 7 mm area inferior pole of left kidney and atherosclerotic disease of abdominal aorta without evidence of aneurysmal dilatation, CTU pending      Most Recent Labs  CBC  Lab Results   Component Value Date    WBC 5.1 2022    WBC 5.1 2022    WBC 5.5 2021    RBC 5.54 2022    RBC 5.55 2022    RBC 5.90 2021    HGB 16.1 2022    HGB 16.2 2022    HGB 17.0 2021    HCT 49.7 2022    HCT 48.2 2022    HCT 51.8 2021    MCV 89.7 2022    MCV 86.8 2022    MCV 87.8 2021     2022     2022     2021      CMP  Lab Results   Component Value Date     2022     2022    K 4.8 2022    K 4.4 2022     2022     2022    CO2 26 2022 CO2 27 01/20/2022    ANIONGAP 11 03/01/2022    GLUCOSE 94 03/01/2022    GLUCOSE 93 01/20/2022    BUN 20 03/01/2022    BUN 16 01/20/2022    CREATININE 1.1 03/01/2022    CREATININE 1.02 01/20/2022    LABGLOM >60 03/01/2022    GFRAA >60 03/01/2022    CALCIUM 9.7 03/01/2022    CALCIUM 9.7 01/20/2022    PROT 7.5 03/08/2022    PROT 7.1 03/01/2022    LABALBU 4.4 03/08/2022    LABALBU 4.3 03/01/2022    LABALBU 4.6 01/20/2022    BILITOT 0.8 03/08/2022    BILITOT 0.6 03/01/2022    BILITOT 0.8 01/20/2022    ALKPHOS 140 03/08/2022    ALKPHOS 148 03/01/2022    ALKPHOS 165 01/20/2022    AST 57 03/08/2022    AST 75 03/01/2022    AST 63 01/20/2022     03/08/2022     03/01/2022     01/20/2022     A1C  Lab Results   Component Value Date    LABA1C 5.9 03/01/2022    LABA1C 6.0 01/20/2022     TSH  Lab Results   Component Value Date    TSH 1.030 03/01/2022     FREET4  No results found for: K0SUZZT  LIPID  Lab Results   Component Value Date    CHOL 247 03/01/2022    CHOL 268 01/20/2022    HDL 59 03/01/2022    HDL 63 01/20/2022    LDLCALC 156 03/01/2022    LDLCALC 182 01/20/2022    TRIG 161 03/01/2022    TRIG 105 01/20/2022    CHOLHDLRATIO 4.3 01/20/2022     VITAMIN D  No results found for: VITD25  MAGNESIUM  No results found for: MG   PHOS  No results found for: PHOS   THOMPSON   No results found for: THOMPSON  RHEUMATOID FACTOR  No results found for: RF  PSA  No results found for: PSA   HEPATITIS C  Lab Results   Component Value Date    HCVABI Non-Reactive 03/01/2022     HIV  No results found for: SCL8IEV, HIV1QT  UA  Lab Results   Component Value Date    COLORU Yellow 03/01/2022    COLORU Yellow 03/02/2021    CLARITYU Clear 03/01/2022    CLARITYU Clear 03/02/2021    GLUCOSEU Negative 03/01/2022    GLUCOSEU Negative 03/02/2021    BILIRUBINUR Negative 03/01/2022    BILIRUBINUR Negative 03/02/2021    KETUA Negative 03/01/2022    KETUA Negative 03/02/2021    SPECGRAV <=1.005 03/01/2022    SPECGRAV 1.020 03/02/2021    BLOODU TRACE-INTACT 03/01/2022    BLOODU Negative 03/02/2021    PHUR 5.5 03/01/2022    PHUR 6.0 03/02/2021    PROTEINU Negative 03/01/2022    PROTEINU Negative 03/02/2021    UROBILINOGEN 0.2 03/01/2022    UROBILINOGEN 0.2 03/02/2021    NITRU Negative 03/01/2022    NITRU Negative 03/02/2021    LEUKOCYTESUR Negative 03/01/2022    LEUKOCYTESUR Negative 03/02/2021     Urine Micro/Albumin Ratio  Lab Results   Component Value Date    MALBCR - 03/01/2022         US ABDOMEN COMPLETE    Result Date: 3/7/2022  EXAMINATION: COMPLETE ABDOMINAL ULTRASOUND 3/7/2022 8:57 am COMPARISON: None. HISTORY: ORDERING SYSTEM PROVIDED HISTORY: Elevated LFTs TECHNOLOGIST PROVIDED HISTORY: This procedure can be scheduled via [facility] [facility]. Access your [employer] account by visiting [employer]. Reason for exam:->assess LIVER/GB/KIDNEYS; ELEVATED LFTS; ATYPICAL UROTHELIAL CELLS URINE What reading provider will be dictating this exam?->CRC FINDINGS: LIVER: The liver demonstrates normal echogenicity without evidence of intrahepatic biliary ductal dilatation. BILIARY SYSTEM: Gallbladder is unremarkable without evidence of pericholecystic fluid, wall thickening or stones. Negative sonographic Gomez's sign. Common bile duct is within normal limits measuring 5 mm. KIDNEYS: The kidneys are unremarkable in appearance without evidence of hydronephrosis. The right kidney measures in length from pole to pole 10.0 cm. There is no solid mass or renal cortical cyst seen within the right kidney. No hydronephrosis or nephrolithiasis. The left kidney measures in length from pole to pole 11.2 cm. There is question of a small solid lesion at the inferior pole of the left kidney measuring 7 mm. CT scan per renal mass protocol is recommended for further evaluation. PANCREAS: Visualized portions of the pancreas are unremarkable. SPLEEN: The spleen is unremarkable in appearance. Spleen is within normal limits in size.   The spleen measures in length from pole to pole 11.5 cm. IVC: The IVC is patent. AORTA: Aorta is patent without aneurysm. Proximal abdominal aorta measures 1.9 x 1.8 cm. Mid abdominal measures 1.6 x 1.2 cm. Distal abdominal measures 1.4 x 1.4 cm. Atherosclerotic disease seen within the distal abdominal aorta. No aneurysmal dilatation identified. OTHER: No evidence of ascites. There is question of a soft tissue 7 mm area in the inferior pole of the left kidney with atherosclerotic disease of the abdominal aorta with no evidence of aneurysmal dilatation. The remainder of the ultrasound of the abdomen is unremarkable. RECOMMENDATIONS: Unavailable       Review of Systems  ROS:  Const: Denies chills, fever, malaise and sweats. Eyes: Denies discharge, pain, redness and visual disturbance. ENMT: Denies earaches, other ear symptoms. Denies nasal or sinus symptoms other than stated  above. Denies mouth and tongue lesions and sore throat. CV: Denies chest discomfort, pain; diaphoresis, dizziness, edema, lightheadedness, orthopnea,  palpitations, syncope and near syncopal episode or any exertional symptoms  Resp: Denies cough, hemoptysis, pleuritic pain, SOB, sputum production and wheezing. GI: Denies abdominal pain, change in bowel habits, hematochezia, melena, nausea and vomiting. : Denies urinary symptoms including dysuria , urgency, frequency or hematuria. Musculo: Denies musculoskeletal symptoms. Skin: Denies bruising and rash. Neuro: Denies headache, numbness, stiff neck, tingling and focal weakness slurred speech or facial  droop  Hema/Lymph: Denies bleeding/bruising tendency and enlarged lymph nodes      No current outpatient medications on file.   Allergies   Allergen Reactions    Cherry Itching       Past Medical History:   Diagnosis Date    Patient denies medical problems      Past Surgical History:   Procedure Laterality Date    BACK SURGERY  2010    Discectomy; Dr Bunny Runner     Family History   Problem Relation Age of Onset    Prostate Cancer Father     Prostate Cancer Maternal Grandfather          age 80, dx 80's    Leukemia Paternal Grandfather         around age [de-identified]     Social History     Tobacco Use    Smoking status: Former Smoker     Packs/day: 0.50     Start date:      Quit date: 2017     Years since quittin.2    Smokeless tobacco: Current User     Types: Chew    Tobacco comment: nicotine pouches   Substance Use Topics    Alcohol use: No    Drug use: No      Social History     Social History Narrative     [employer]    (Gets yrly cxr, bw, occ pfts)          - works [employer]    1 daughter age 3 as of         Vitals:    22 1027   BP: 120/64   Pulse: 64   Temp: 97.8 °F (36.6 °C)   SpO2: 98%   Weight: 199 lb (90.3 kg)      Wt Readings from Last 3 Encounters:   22 199 lb (90.3 kg)   22 200 lb (90.7 kg)   22 202 lb (91.6 kg)        Physical Exam  Exam:  Const: Appears comfortable. No signs of acute distress present. Head/Face: Atraumatic on inspection. Eyes: EOMI in both eyes. No discharge from the eyes. PERRL. Sclerae clear. ENMT: Auditory canals normal. Tympanic membranes: intact and translucent. External nose WNL. Nasal mucosa is clear. Oropharynx: No erythema or exudate. Posterior pharynx is normal.  Neck: Supple. Palpation reveals no adenopathy. No masses appreciated. No JVD. Carotids: no  bruits. Resp: Respirations are unlabored. Clear to auscultation. No rales, rhonchi or wheezes appreciated  over the lungs bilaterally. CV: Rate is regular. Rhythm is regular. No gallop or rubs. No heart murmur appreciated. Extremities: No clubbing, cyanosis, or edema. No calf inflammation or tenderness. Abdomen: Bowel sounds are normoactive. Abdomen is soft, nontender, and nondistended. No  abdominal masses. No palpable hepatosplenomegaly. Lymph: No palpable or visible regional lymphadenopathy. Musculoskeletal: no acute joint inflammation.   Skin: Dry and warm with no rash. Skin normal to inspection and palpation overall. Neuro: Alert and oriented. Affect: appropriate. Upper Extremities: 5/5 bilaterally. Lower Extremities:  5/5 bilaterally. Sensation intact to light touch. Reflexes: DTR's are symmetric and 2+ bilaterally. .  Cranial Nerves: Cranial nerves grossly intact. Office Labs This Visit :  No results found for this visit on 03/14/22. 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 Abnormal urine cytology     3. Asymptomatic microscopic hematuria  Urinalysis   4. Elevated LFTs  Hep A Vaccine Adult (VAQTA)    Hep B Vaccine Adult (ENGERIX B)    Comprehensive Metabolic Panel    Microalbumin / Creatinine Urine Ratio   5. Prediabetes  Hemoglobin A1C    Microalbumin / Creatinine Urine Ratio   6. Mixed hyperlipidemia  Comprehensive Metabolic Panel    CK    Lipid Panel   7. Health maintenance examination     8. Need for hepatitis A and B vaccination  Hep A Vaccine Adult (VAQTA)    Hep B Vaccine Adult (ENGERIX B)   9. Abnormal serum iron level  Ferritin     Neoplasm of uncertain behavior of left kidney    noted on ultrasound. Previously had abnormal urine cytology at work, repeat normalized. Trace microscopic hematuria. Await CTU, risk benefits reviewed, await urology    Asymptomatic microscopic hematuria    Counseled extensively. Differential reviewed, including serious etiologies. Ultrasound complete. Await CTU. Await urology. Initial cytology abnormal repeat normal    Need for hepatitis A and B vaccination    start hepatitis A/B series. Timeframe for completion reviewed    Abnormal serum iron level    counseled, ordered by and continue per GI. Check ferritin with next blood work. Recommended blood donation in the meantime. Elevated LFTs    Counseled extensively. Differential reviewed, including serious etiologies. Consider fatty liver. Precautions reviewed.   Risks of even fatty liverreviewed. Lifestyle modification and appropriate diet and weight loss reviewed. Asymptomatic. Lifestyle modification. Ultrasound negative. Serum iron somewhat elevated. See comments under that section. Continue closely per GI. Follow-up blood work 3 months sooner if symptoms      Prediabetes    counseled, consider insulin sensitizer, lifestyle modification, repeat normalized  With a hemoglobin A1c of 5.9 which is borderline. Recheck 3 months. Abnormal urine cytology    Counseled extensively. Differential reviewed, including serious etiologies. Prior history of smoking and history of benzene exposure but no family history or other risk factors, asymptomatic and young age. Repeat cytology negative but trace microscopic blood on urinalysis. Ultrasound did reveal Questionable soft tissue 7 mm mass left kidney, CTU pending. Consultation with Dr. Kiana Cifuentes pending. Importance of follow-up reviewed. Health maintenance examination    encouraged, he will schedule next appointment for this. Mixed hyperlipidemia    counseled, consider statin which various effects on liver reviewed. However liver function test abnormality needs to be further assessed/treated and LFTs will need to trend down before initiating. Recheck in 3 months      No flowsheet data found. Plan as above. Counseled extensively and differential diagnoses relevant to above were reviewed, including serious etiologies. Side effects and interactions of medications were reviewed. CMP A! C Ferritin, LIPID 3mos  FU 1month to fu on all specialists, hepatitis B #2 then, review ct results, etc and plan physical then  HEP A/B start series    As long as symptoms steadily improve/resolve, and medical conditions follow the expected course, FU as below, sooner PRN.     Return in about 1 month (around 4/14/2022) for physical.       Over 40 minutes  spent with the patient in reviewing records, reviewing with patient/family, counseling, ordering,  prescribing, completing h&p, etc., with over 50% of the time spent face to face counseling. Signs and symptoms to watch for discussed, serious signs and symptoms reviewed. ER if any. Zeb Felder MD    Patients are advised to check with insurance company to ensure coverage and to fully understand benefits and cost prior to any testing. This note was created with the assistance of voice recognition software. Document was reviewed however may contain grammatical errors.

## 2022-03-14 NOTE — ASSESSMENT & PLAN NOTE
noted on ultrasound. Previously had abnormal urine cytology at work, repeat normalized. Trace microscopic hematuria.   Await CTU, risk benefits reviewed, await urology

## 2022-03-15 DIAGNOSIS — R74.8 ELEVATED LIVER ENZYMES: Primary | ICD-10-CM

## 2022-03-22 ENCOUNTER — HOSPITAL ENCOUNTER (OUTPATIENT)
Dept: CT IMAGING | Age: 37
Discharge: HOME | End: 2022-03-24
Payer: COMMERCIAL

## 2022-03-22 DIAGNOSIS — R82.89 ABNORMAL URINE CYTOLOGY: ICD-10-CM

## 2022-03-22 DIAGNOSIS — D41.02 NEOPLASM OF UNCERTAIN BEHAVIOR OF LEFT KIDNEY: ICD-10-CM

## 2022-03-22 PROCEDURE — 6360000004 HC RX CONTRAST MEDICATION: Performed by: RADIOLOGY

## 2022-03-22 PROCEDURE — 74178 CT ABD&PLV WO CNTR FLWD CNTR: CPT

## 2022-03-22 RX ADMIN — IOPAMIDOL 75 ML: 755 INJECTION, SOLUTION INTRAVENOUS at 16:24

## 2022-03-30 PROBLEM — Z00.00 HEALTH MAINTENANCE EXAMINATION: Status: RESOLVED | Noted: 2022-02-28 | Resolved: 2022-03-30

## 2022-04-04 DIAGNOSIS — R74.8 ELEVATED LIVER ENZYMES: ICD-10-CM

## 2022-04-04 LAB
ALBUMIN SERPL-MCNC: 4.3 G/DL (ref 3.5–5.2)
ALP BLD-CCNC: 142 U/L (ref 40–129)
ALT SERPL-CCNC: 130 U/L (ref 0–40)
AST SERPL-CCNC: 72 U/L (ref 0–39)
BILIRUB SERPL-MCNC: 0.5 MG/DL (ref 0–1.2)
BILIRUBIN DIRECT: <0.2 MG/DL (ref 0–0.3)
BILIRUBIN, INDIRECT: ABNORMAL MG/DL (ref 0–1)
GAMMA GLUTAMYL TRANSFERASE: 570 U/L (ref 10–71)
TOTAL PROTEIN: 7.5 G/DL (ref 6.4–8.3)

## 2022-04-05 DIAGNOSIS — R74.8 ELEVATED LIVER ENZYMES: Primary | ICD-10-CM

## 2022-04-06 DIAGNOSIS — R74.8 ELEVATED LIVER ENZYMES: ICD-10-CM

## 2022-04-06 LAB
FERRITIN: 419 NG/ML
TRANSFERRIN: 263 MG/DL (ref 200–360)

## 2022-04-07 ENCOUNTER — OFFICE VISIT (OUTPATIENT)
Dept: GASTROENTEROLOGY | Age: 37
End: 2022-04-07
Payer: COMMERCIAL

## 2022-04-07 VITALS
HEART RATE: 57 BPM | WEIGHT: 194 LBS | DIASTOLIC BLOOD PRESSURE: 82 MMHG | SYSTOLIC BLOOD PRESSURE: 133 MMHG | BODY MASS INDEX: 28.73 KG/M2 | HEIGHT: 69 IN

## 2022-04-07 DIAGNOSIS — R74.8 ELEVATED LIVER ENZYMES: Primary | ICD-10-CM

## 2022-04-07 PROCEDURE — 99212 OFFICE O/P EST SF 10 MIN: CPT | Performed by: NURSE PRACTITIONER

## 2022-04-07 NOTE — PROGRESS NOTES
García Schmidt (:  1985) is a 43 y.o. male, here for evaluation of the following chief complaint(s):  Follow-up (1 month follow up)      SUBJECTIVE/OBJECTIVE:  HPI:    Liudmila Solano is a very pleasant 43year old gentleman that presents today for a follow up on elevated liver enzymes. To review from last office visit:  Labs from 3/1/22 noted an alkaline phosphatase of 140, ALT of 145, and AST of 75. Cholesterol level was 247 with triglycerides of 161. Hepatitis panel negative and HIV negative. Ultrasound 3/7/21 noted   normal echogenicity without evidence of   intrahepatic biliary ductal dilatation. Liver work up showed a GGT of 570, ALT of 130, AST of 72, and alkaline phosphatase of 142. Otherwise, the rest of the work up was negative. Patient denies yellowing of the skin or eyes, pruritis, or abdominal pain. He mentions feeling tired but just getting off the night shift. ROS:  General: Patient denies n/v/f/c or weight loss. HEENT: Patient denies persistent postnasal drip, scleral icterus, drooling, persistent bleeding from nose/mouth. Resp: Patient denies SOB, wheezing, productive cough. Cards: Patient denies CP, palpitations, significant edema  GI: As above. Derm: Patient denies jaundice/rashes. Musc: Patient denies diffuse/irregular joint swelling or myalgias.       Objective   Wt Readings from Last 3 Encounters:   22 194 lb (88 kg)   22 199 lb (90.3 kg)   22 200 lb (90.7 kg)     Temp Readings from Last 3 Encounters:   22 97.8 °F (36.6 °C)   22 97.8 °F (36.6 °C)   20 97.9 °F (36.6 °C)     BP Readings from Last 3 Encounters:   22 133/82   22 120/64   22 (!) 147/95     Pulse Readings from Last 3 Encounters:   22 57   22 64   22 68        Physical Exam    Past Medical History:   Diagnosis Date    Patient denies medical problems       Past Surgical History:   Procedure Laterality Date    BACK SURGERY   Discectomy; Dr Valerie Loza      Family History   Problem Relation Age of Onset    Prostate Cancer Father     Prostate Cancer Maternal Grandfather          age 80, dx 80's    Leukemia Paternal Grandfather         around age [de-identified]        Lab Results   Component Value Date    WBC 5.1 2022    HGB 16.1 2022    HCT 49.7 2022    MCV 89.7 2022     2022      Lab Results   Component Value Date     2022    K 4.8 2022     2022    CO2 26 2022    BUN 20 2022    CREATININE 1.1 2022    GLUCOSE 94 2022    CALCIUM 9.7 2022    PROT 7.5 2022    LABALBU 4.3 2022    BILITOT 0.5 2022    ALKPHOS 142 (H) 2022    AST 72 (H) 2022     (H) 2022    LABGLOM >60 2022    GFRAA >60 2022                       ASSESSMENT/PLAN:    1. Elevated liver enzymes  -     Hepatic Function Panel; Standing    -Patient will obtain hepatic function panel monthly for the next 3 months  -He will return to see Dr. Nai Dc to determine if further testing is warranted  -Advised patient avoid alcohol, tylenol, NSAIDs, and all over the counter supplements      Return for Follow up withDr. Nai Dc. An electronic signature was used to authenticate this note.     --STORM Cox - CNP

## 2022-04-15 DIAGNOSIS — R74.8 ELEVATED LIVER ENZYMES: ICD-10-CM

## 2022-04-15 LAB
ALBUMIN SERPL-MCNC: 4.3 G/DL (ref 3.5–5.2)
ALP BLD-CCNC: 149 U/L (ref 40–129)
ALT SERPL-CCNC: 151 U/L (ref 0–40)
AST SERPL-CCNC: 82 U/L (ref 0–39)
BILIRUB SERPL-MCNC: 0.8 MG/DL (ref 0–1.2)
BILIRUBIN DIRECT: <0.2 MG/DL (ref 0–0.3)
BILIRUBIN, INDIRECT: ABNORMAL MG/DL (ref 0–1)
TOTAL PROTEIN: 7.1 G/DL (ref 6.4–8.3)

## 2022-04-18 ENCOUNTER — OFFICE VISIT (OUTPATIENT)
Dept: GASTROENTEROLOGY | Age: 37
End: 2022-04-18
Payer: COMMERCIAL

## 2022-04-18 ENCOUNTER — OFFICE VISIT (OUTPATIENT)
Dept: PRIMARY CARE CLINIC | Age: 37
End: 2022-04-18
Payer: COMMERCIAL

## 2022-04-18 ENCOUNTER — TELEPHONE (OUTPATIENT)
Dept: GASTROENTEROLOGY | Age: 37
End: 2022-04-18

## 2022-04-18 VITALS
WEIGHT: 196.1 LBS | DIASTOLIC BLOOD PRESSURE: 80 MMHG | SYSTOLIC BLOOD PRESSURE: 130 MMHG | RESPIRATION RATE: 16 BRPM | HEIGHT: 69 IN | TEMPERATURE: 97.1 F | BODY MASS INDEX: 29.04 KG/M2 | HEART RATE: 99 BPM | OXYGEN SATURATION: 96 %

## 2022-04-18 VITALS
WEIGHT: 195 LBS | HEART RATE: 57 BPM | OXYGEN SATURATION: 98 % | TEMPERATURE: 97.8 F | SYSTOLIC BLOOD PRESSURE: 122 MMHG | BODY MASS INDEX: 28.8 KG/M2 | DIASTOLIC BLOOD PRESSURE: 70 MMHG

## 2022-04-18 DIAGNOSIS — Z00.00 HEALTH MAINTENANCE EXAMINATION: Primary | ICD-10-CM

## 2022-04-18 DIAGNOSIS — D41.02 NEOPLASM OF UNCERTAIN BEHAVIOR OF LEFT KIDNEY: ICD-10-CM

## 2022-04-18 DIAGNOSIS — R79.89 ELEVATED LFTS: Primary | ICD-10-CM

## 2022-04-18 DIAGNOSIS — R82.89 ABNORMAL URINE CYTOLOGY: ICD-10-CM

## 2022-04-18 DIAGNOSIS — R79.0 ABNORMAL SERUM IRON LEVEL: ICD-10-CM

## 2022-04-18 DIAGNOSIS — R79.89 ELEVATED LFTS: ICD-10-CM

## 2022-04-18 DIAGNOSIS — R73.03 PREDIABETES: ICD-10-CM

## 2022-04-18 DIAGNOSIS — E78.2 MIXED HYPERLIPIDEMIA: ICD-10-CM

## 2022-04-18 DIAGNOSIS — R31.21 ASYMPTOMATIC MICROSCOPIC HEMATURIA: ICD-10-CM

## 2022-04-18 PROCEDURE — 90471 IMMUNIZATION ADMIN: CPT | Performed by: FAMILY MEDICINE

## 2022-04-18 PROCEDURE — 90746 HEPB VACCINE 3 DOSE ADULT IM: CPT | Performed by: FAMILY MEDICINE

## 2022-04-18 PROCEDURE — 99213 OFFICE O/P EST LOW 20 MIN: CPT | Performed by: STUDENT IN AN ORGANIZED HEALTH CARE EDUCATION/TRAINING PROGRAM

## 2022-04-18 PROCEDURE — 99395 PREV VISIT EST AGE 18-39: CPT | Performed by: FAMILY MEDICINE

## 2022-04-18 ASSESSMENT — PATIENT HEALTH QUESTIONNAIRE - PHQ9
SUM OF ALL RESPONSES TO PHQ9 QUESTIONS 1 & 2: 0
SUM OF ALL RESPONSES TO PHQ QUESTIONS 1-9: 0
1. LITTLE INTEREST OR PLEASURE IN DOING THINGS: 0
2. FEELING DOWN, DEPRESSED OR HOPELESS: 0

## 2022-04-18 NOTE — TELEPHONE ENCOUNTER
Called patient to give him instructions of his scheduled MRCP   Appointment on 5/5/22 @ 9:30    NPO 8 hours prior  List of meds  No clothing with metal and no Jewelry

## 2022-04-18 NOTE — PROGRESS NOTES
[facility])  Gastroenterology, Hepatology &  Advanced Endoscopy     Progress Note      SUBJECTIVE:      Mr. Juan Dutton is a 37y/M who presents to clinic today for continued evaluation of elevated LFTs. Serologic and imaging studies thus far are negative. He continues to show no signs of hepatic decompensation or jaundice. OBJECTIVE      Physical    VITALS:  /80   Pulse 99   Temp 97.1 °F (36.2 °C) (Temporal)   Resp 16   Ht 5' 9\" (1.753 m)   Wt 196 lb 1.6 oz (89 kg)   SpO2 96%   BMI 28.96 kg/m²   Physical Exam:  General: Overall well-appearing, NAD  HEENT: PERRLA, EOMI, Anicteric sclera, MMM, no rhinorrhea  Cards: RRR, no LE edema  Resp: Breathing comfortably on room air, good air movement, no use of accessory muscles, no audible wheezing  Abdomen: soft, NT, ND. Extremities: Moves all extremities, no effusions or bruising. Skin: No rashes or jaundice  Neuro: A&O x 3, CN grossly intact, non-focal exam     ASSESSMENT AND PLAN      37y/M presents for continued evaluation of elevated LFTs. PLAN:  -I will complete the remaining serologic workup  -We will proceed with MRI/MRCP to ensure no biliary findings  -We will continue monitoring LFTs    We will see the patient back in clinic following this workup. Thank you for including us in the care of this patient. Please do not hesitate to contact us with any additional questions or concerns.     Shivam Mccullough MD  Gastroenterology/Hepatology  Advanced Endoscopy

## 2022-04-18 NOTE — PROGRESS NOTES
Wendy Valadez : 1985 Sex: male  Age: 43 y.o. Chief Complaint   Patient presents with   [address] Other     review ct        HPI  HPI:        Patient presents today to follow-up on multiple and physical.  He is regularly following GI, saw the nurse practitioner several times and sees the doctor today. They did mention possible hemochromatosis. He defers hematology consultation. He will discuss further with GI physician today. They are talked about possible liver biopsy. LFTs been elevated but stable. Other blood work reviewed. Feels well. Has not heard from neurologist yet and we will look into. CT interestingly negative, kidney lesion not described, no findings to account for hematuria      Health Maintenance:  Proper diet reviewed including Mediterranean and DASH diets. Counseled on healthy weight, appropriate exercise, avoidance of tobacco, and recommendations for minimal to no alcohol consumption. Counseled on the potential pros and cons of vitamins and supplements. Reviewed the recommendations and risk/benefits of vaccines including J/J x 2;  Td/Tdap (),  pneumovax,  prevnar 13 , flu vaccine (Declines) , Hepatitis vaccines (undergoing A/B), gardasil (counseled), and shingrix (patient had chicken pox). HIV and Hep C screening guidelines were reviewed. Importance of regular eye and dental exams and health reviewed. Risks/Benefits of ASA reviewed and discussed latest guidelines. Sun protection reviewed. Notify if any new or changing moles/skin lesions, etc. Dexa Scan indications/ risk factors for osteoporotic fractures (and associated M/M) and preventative measures reviewed. Counseled on testicular exams and prostate exams. Colonoscopy recommendations reviewed. Pt denies change in bowel habits or [address] of colon polyps/CA. Fit test discussed       Indications for EKG and  for additional  cardiac testing including referrals, stress testing,  2d echo, ect. dasx. Reviewed indications for other testing such as  PFT's.and  indications for imaging including brain, carotid, chest, abdominal, aortic .  dasx.         Most Recent Labs  CBC  Lab Results   Component Value Date    WBC 5.1 03/08/2022    WBC 5.1 01/20/2022    WBC 5.5 03/02/2021    RBC 5.54 03/08/2022    RBC 5.55 01/20/2022    RBC 5.90 03/02/2021    HGB 16.1 03/08/2022    HGB 16.2 01/20/2022    HGB 17.0 03/02/2021    HCT 49.7 03/08/2022    HCT 48.2 01/20/2022    HCT 51.8 03/02/2021    MCV 89.7 03/08/2022    MCV 86.8 01/20/2022    MCV 87.8 03/02/2021     03/08/2022     01/20/2022     03/02/2021      CMP  Lab Results   Component Value Date     03/01/2022     01/20/2022    K 4.8 03/01/2022    K 4.4 01/20/2022     03/01/2022     01/20/2022    CO2 26 03/01/2022    CO2 27 01/20/2022    ANIONGAP 11 03/01/2022    GLUCOSE 94 03/01/2022    GLUCOSE 93 01/20/2022    BUN 20 03/01/2022    BUN 16 01/20/2022    CREATININE 1.1 03/01/2022    CREATININE 1.02 01/20/2022    LABGLOM >60 03/01/2022    GFRAA >60 03/01/2022    CALCIUM 9.7 03/01/2022    CALCIUM 9.7 01/20/2022    PROT 7.1 04/15/2022    PROT 7.5 04/04/2022    PROT 7.5 03/08/2022    LABALBU 4.3 04/15/2022    LABALBU 4.3 04/04/2022    LABALBU 4.4 03/08/2022    BILITOT 0.8 04/15/2022    BILITOT 0.5 04/04/2022    BILITOT 0.8 03/08/2022    ALKPHOS 149 04/15/2022    ALKPHOS 142 04/04/2022    ALKPHOS 140 03/08/2022    AST 82 04/15/2022    AST 72 04/04/2022    AST 57 03/08/2022     04/15/2022     04/04/2022     03/08/2022     A1C  Lab Results   Component Value Date    LABA1C 5.9 03/01/2022    LABA1C 6.0 01/20/2022     TSH  Lab Results   Component Value Date    TSH 1.030 03/01/2022     FREET4  No results found for: R5VPKKO  LIPID  Lab Results   Component Value Date    CHOL 247 03/01/2022    CHOL 268 01/20/2022    HDL 59 03/01/2022    HDL 63 01/20/2022    LDLCALC 156 03/01/2022    LDLCALC 182 01/20/2022    TRIG 161 03/01/2022 TRIG 105 01/20/2022    CHOLHDLRATIO 4.3 01/20/2022     VITAMIN D  No results found for: VITD25  MAGNESIUM  No results found for: MG   PHOS  No results found for: PHOS   THOMPSON   No results found for: THOMPSON  RHEUMATOID FACTOR  No results found for: RF  PSA  No results found for: PSA   HEPATITIS C  Lab Results   Component Value Date    HCVABI Non-Reactive 03/01/2022     HIV  No results found for: KCK3HPF, HIV1QT  UA  Lab Results   Component Value Date    COLORU Yellow 03/01/2022    COLORU Yellow 03/02/2021    CLARITYU Clear 03/01/2022    CLARITYU Clear 03/02/2021    GLUCOSEU Negative 03/01/2022    GLUCOSEU Negative 03/02/2021    BILIRUBINUR Negative 03/01/2022    BILIRUBINUR Negative 03/02/2021    KETUA Negative 03/01/2022    KETUA Negative 03/02/2021    SPECGRAV <=1.005 03/01/2022    SPECGRAV 1.020 03/02/2021    BLOODU TRACE-INTACT 03/01/2022    BLOODU Negative 03/02/2021    PHUR 5.5 03/01/2022    PHUR 6.0 03/02/2021    PROTEINU Negative 03/01/2022    PROTEINU Negative 03/02/2021    UROBILINOGEN 0.2 03/01/2022    UROBILINOGEN 0.2 03/02/2021    NITRU Negative 03/01/2022    NITRU Negative 03/02/2021    LEUKOCYTESUR Negative 03/01/2022    LEUKOCYTESUR Negative 03/02/2021     Urine Micro/Albumin Ratio  Lab Results   Component Value Date    MALBCR - 03/01/2022            Review of Systems  ROS:  Const: Denies chills, fever, malaise and sweats. Eyes: Denies discharge, pain, redness and visual disturbance. ENMT: Denies earaches, other ear symptoms. Denies nasal or sinus symptoms other than stated  above. Denies mouth and tongue lesions and sore throat. CV: Denies chest discomfort, pain; diaphoresis, dizziness, edema, lightheadedness, orthopnea,  palpitations, syncope and near syncopal episode or any exertional symptoms  Resp: Denies cough, hemoptysis, pleuritic pain, SOB, sputum production and wheezing. GI: Denies abdominal pain, change in bowel habits, hematochezia, melena, nausea and vomiting.   : Denies urinary symptoms including dysuria , urgency, frequency or hematuria. Musculo: Denies musculoskeletal symptoms. Skin: Denies bruising and rash. Neuro: Denies headache, numbness, stiff neck, tingling and focal weakness slurred speech or facial  droop  Hema/Lymph: Denies bleeding/bruising tendency and enlarged lymph nodes      No current outpatient medications on file. Allergies   Allergen Reactions    Cherry Itching       Past Medical History:   Diagnosis Date    Patient denies medical problems      Past Surgical History:   Procedure Laterality Date    BACK SURGERY  2010    Discectomy; Dr Valerie Loza     Family History   Problem Relation Age of Onset    Prostate Cancer Father     Prostate Cancer Maternal Grandfather          age 80, dx 80's    Leukemia Paternal Grandfather         around age [de-identified]     Social History     Tobacco Use    Smoking status: Former Smoker     Packs/day: 0.50     Start date:      Quit date:      Years since quittin.2    Smokeless tobacco: Current User     Types: Chew    Tobacco comment: nicotine pouches   Substance Use Topics    Alcohol use: No    Drug use: No      Social History     Social History Narrative     [employer]    (Gets yrly cxr, bw, occ pfts)          - works [employer]    1 daughter age 3 as of         Vitals:    22 0810   BP: 122/70   Pulse: 57   Temp: 97.8 °F (36.6 °C)   SpO2: 98%   Weight: 195 lb (88.5 kg)      Wt Readings from Last 3 Encounters:   22 195 lb (88.5 kg)   22 194 lb (88 kg)   22 199 lb (90.3 kg)        Physical Exam  Exam:  Const: Appears comfortable. No signs of acute distress present. Head/Face: Atraumatic on inspection. Eyes: EOMI in both eyes. No discharge from the eyes. PERRL. Sclerae clear. ENMT: Auditory canals normal. Tympanic membranes: intact and translucent. External nose WNL. Nasal mucosa is clear. Oropharynx: No erythema or exudate.  Posterior pharynx is normal.  Neck: Supple. Palpation reveals no adenopathy. No masses appreciated. No JVD. Carotids: no  bruits. Resp: Respirations are unlabored. Clear to auscultation. No rales, rhonchi or wheezes appreciated  over the lungs bilaterally. CV: Rate is regular. Rhythm is regular. No gallop or rubs. No heart murmur appreciated. Extremities: No clubbing, cyanosis, or edema. No calf inflammation or tenderness. Abdomen: Bowel sounds are normoactive. Abdomen is soft, nontender, and nondistended. No  abdominal masses. No palpable hepatosplenomegaly. Lymph: No palpable or visible regional lymphadenopathy. Musculoskeletal: no acute joint inflammation. Skin: Dry and warm with no rash. Skin normal to inspection and palpation overall. Neuro: Alert and oriented. Affect: appropriate. Upper Extremities: 5/5 bilaterally. Lower Extremities:  5/5 bilaterally. Sensation intact to light touch. Reflexes: DTR's are symmetric and 2+ bilaterally. .  Cranial Nerves: Cranial nerves grossly intact. Genitalia NL male, testes descended without nodularity or hernia with and without valsalva. Office Labs This Visit :  No results found for this visit on 04/18/22. Assessment and Plan:   Diagnosis Orders   1. Health maintenance examination  Hep B Vaccine Adult (ENGERIX B)   2. Elevated LFTs  Hep B Vaccine Adult (ENGERIX B)   3. Abnormal serum iron level     4. 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 Abnormal urine cytology     7. Prediabetes     8. Mixed hyperlipidemia       Health maintenance examination  Health maintenance issues discussed at length as above, 4/18/2022 . Encouraged yearly physicals. Neoplasm of uncertain behavior of left kidney    noted on ultrasound. Previously had abnormal urine cytology at work, repeat normalized. Trace microscopic hematuria. CTU negative, awaiting urology      Asymptomatic microscopic hematuria    Counseled extensively. Differential reviewed, including serious etiologies. Ultrasound complete. CTU negative, awaiting urology. Initial cytology abnormal repeat normal    Need for hepatitis A and B vaccination  Hepatitis B #2 today, plan #3 and hepatitis A #2 after September 14, 2022      Abnormal serum iron level    counseled, ordered by and continue per GI. Check ferritin with next blood work. Recommended blood donation in the meantime. Consider hematologydefers now. Elevated LFTs    Counseled extensively. Differential reviewed, including serious etiologies. Consider fatty liver. Precautions reviewed. Risks of even fatty liverreviewed including cirrhosis. Lifestyle modification and appropriate diet and weight loss reviewed. Asymptomatic. Lifestyle modification. Ultrasound negative. Serum iron somewhat elevated. See comments under that section. Continue closely per GI. They are checking blood work monthly. Considering liver biopsy. Prediabetes    counseled, consider insulin sensitizer, lifestyle modification, repeat normalized  With a hemoglobin A1c of 5.9 which is borderline. Mid June sooner if symptoms      Abnormal urine cytology    Counseled extensively. Differential reviewed, including serious etiologies. Prior history of smoking and history of benzene exposure but no family history or other risk factors, asymptomatic and young age. Repeat cytology negative but trace microscopic blood on urinalysis. Ultrasound did reveal Questionable soft tissue 7 mm mass left kidney, CTU negative consultation with Dr. Mary Ann Rubalcava pending. Awaiting consultation            Mixed hyperlipidemia    counseled, consider statin which various effects on liver reviewed. However liver function test abnormality needs to be further assessed/treated and LFTs will need to trend down before initiating. Recheck mid June      No flowsheet data found. Plan as above.   Counseled extensively and differential diagnoses relevant to above were reviewed, including serious etiologies. Side effects and interactions of medications were reviewed. CMP A! C Ferritin, LIPID June; consider hematologist. Defers now. Sees GI this afternoon again. Has not heard from urology yet. We'll look into. HEP B # 2 now. Will need #3 and HEP A #2 after 9/14    FU 1month, sooner prn    As long as symptoms steadily improve/resolve, and medical conditions follow the expected course, FU as below, sooner PRN. Return in about 1 month (around 5/18/2022), or if symptoms worsen or fail to improve. Signs and symptoms to watch for discussed, serious signs and symptoms reviewed. ER if any. Ladene Klinefelter, MD    Patients are advised to check with insurance company to ensure coverage and to fully understand benefits and cost prior to any testing. This note was created with the assistance of voice recognition software. Document was reviewed however may contain grammatical errors.

## 2022-04-29 ENCOUNTER — TELEPHONE (OUTPATIENT)
Dept: GASTROENTEROLOGY | Age: 37
End: 2022-04-29

## 2022-04-29 NOTE — TELEPHONE ENCOUNTER
Patient states that he will call back and schedule appointment for follow up once he looks at his work schedule he was notified to schedule with NP

## 2022-05-05 ENCOUNTER — HOSPITAL ENCOUNTER (OUTPATIENT)
Dept: GENERAL RADIOLOGY | Age: 37
Discharge: HOME | End: 2022-05-07
Payer: COMMERCIAL

## 2022-05-05 ENCOUNTER — HOSPITAL ENCOUNTER (OUTPATIENT)
Dept: MRI IMAGING | Age: 37
Discharge: HOME | End: 2022-05-07
Payer: COMMERCIAL

## 2022-05-05 DIAGNOSIS — M79.5 FOREIGN BODY (FB) IN SOFT TISSUE: ICD-10-CM

## 2022-05-05 DIAGNOSIS — R79.89 ELEVATED LFTS: ICD-10-CM

## 2022-05-05 PROCEDURE — 74183 MRI ABD W/O CNTR FLWD CNTR: CPT

## 2022-05-05 PROCEDURE — A9579 GAD-BASE MR CONTRAST NOS,1ML: HCPCS | Performed by: RADIOLOGY

## 2022-05-05 PROCEDURE — 6360000004 HC RX CONTRAST MEDICATION: Performed by: RADIOLOGY

## 2022-05-05 PROCEDURE — 70030 X-RAY EYE FOR FOREIGN BODY: CPT

## 2022-05-05 RX ADMIN — GADOTERIDOL 19 ML: 279.3 INJECTION, SOLUTION INTRAVENOUS at 11:16

## 2022-05-12 DIAGNOSIS — R74.8 ELEVATED LIVER ENZYMES: ICD-10-CM

## 2022-05-12 DIAGNOSIS — R79.89 ELEVATED LFTS: ICD-10-CM

## 2022-05-12 LAB
ALBUMIN SERPL-MCNC: 4.7 G/DL (ref 3.5–5.2)
ALP BLD-CCNC: 164 U/L (ref 40–129)
ALT SERPL-CCNC: 165 U/L (ref 0–40)
AST SERPL-CCNC: 79 U/L (ref 0–39)
BILIRUB SERPL-MCNC: 0.5 MG/DL (ref 0–1.2)
BILIRUBIN DIRECT: <0.2 MG/DL (ref 0–0.3)
BILIRUBIN, INDIRECT: ABNORMAL MG/DL (ref 0–1)
TOTAL PROTEIN: 7.5 G/DL (ref 6.4–8.3)

## 2022-05-14 LAB — ALPHA-1 ANTITRYPSIN: 161 MG/DL (ref 90–200)

## 2022-05-19 ENCOUNTER — OFFICE VISIT (OUTPATIENT)
Dept: GASTROENTEROLOGY | Age: 37
End: 2022-05-19
Payer: COMMERCIAL

## 2022-05-19 VITALS
SYSTOLIC BLOOD PRESSURE: 130 MMHG | WEIGHT: 194 LBS | HEIGHT: 69 IN | BODY MASS INDEX: 28.73 KG/M2 | OXYGEN SATURATION: 97 % | RESPIRATION RATE: 16 BRPM | HEART RATE: 70 BPM | DIASTOLIC BLOOD PRESSURE: 70 MMHG | TEMPERATURE: 97.7 F

## 2022-05-19 DIAGNOSIS — R79.89 ELEVATED LFTS: Primary | ICD-10-CM

## 2022-05-19 PROCEDURE — 99212 OFFICE O/P EST SF 10 MIN: CPT | Performed by: NURSE PRACTITIONER

## 2022-05-19 NOTE — PROGRESS NOTES
García Schmidt (:  1985) is a 42 y.o. male, here for evaluation of the following chief complaint(s):  Follow-up (Review MRCP)      SUBJECTIVE/OBJECTIVE:  HPI:    Liudmila Solano is a very pleasant 42year old gentleman that presents today for follow up on elevated liver enzymes and recent  MRCP. MRCP reviewed with patient and unremarkable. Results of hemachromatosis lab work not completed as of yet. Repeat liver enzymes show an increase in AST, ALT, and APO. Patient denies alcohol intake and OTC supplements. Denies jaundice, pruritis, and abdominal pain. Patient is scheduled for the last Twinrix vaccine. ROS:  General: Patient denies n/v/f/c or weight loss. HEENT: Patient denies persistent postnasal drip, scleral icterus, drooling, persistent bleeding from nose/mouth. Resp: Patient denies SOB, wheezing, productive cough. Cards: Patient denies CP, palpitations, significant edema  GI: As above. Derm: Patient denies jaundice/rashes. Musc: Patient denies diffuse/irregular joint swelling or myalgias.       Objective   Wt Readings from Last 3 Encounters:   22 194 lb (88 kg)   22 196 lb 1.6 oz (89 kg)   22 195 lb (88.5 kg)     Temp Readings from Last 3 Encounters:   22 97.7 °F (36.5 °C) (Temporal)   22 97.1 °F (36.2 °C) (Temporal)   22 97.8 °F (36.6 °C)     BP Readings from Last 3 Encounters:   22 130/70   22 130/80   22 122/70     Pulse Readings from Last 3 Encounters:   22 70   22 99   22 57        Physical Exam    Past Medical History:   Diagnosis Date    Patient denies medical problems       Past Surgical History:   Procedure Laterality Date    BACK SURGERY      Discectomy; Dr Sae Wang      Family History   Problem Relation Age of Onset    Prostate Cancer Father     Prostate Cancer Maternal Grandfather          age 80, dx 80's    Leukemia Paternal Grandfather         around age [de-identified]        Lab Results   Component Value Date    WBC 5.1 03/08/2022    HGB 16.1 03/08/2022    HCT 49.7 03/08/2022    MCV 89.7 03/08/2022     03/08/2022      Lab Results   Component Value Date     03/01/2022    K 4.8 03/01/2022     03/01/2022    CO2 26 03/01/2022    BUN 20 03/01/2022    CREATININE 1.1 03/01/2022    GLUCOSE 94 03/01/2022    CALCIUM 9.7 03/01/2022    PROT 7.5 05/12/2022    LABALBU 4.7 05/12/2022    BILITOT 0.5 05/12/2022    ALKPHOS 164 (H) 05/12/2022    AST 79 (H) 05/12/2022     (H) 05/12/2022    LABGLOM >60 03/01/2022    GFRAA >60 03/01/2022                       ASSESSMENT/PLAN:    1. Elevated LFTs    -Will continue to monitor LFTs monthly.  -Awaiting results of hemachromatosis mutation  -RTV 1 month    Return for Follow up 1 month. An electronic signature was used to authenticate this note.     --STORM Maddox - CNP

## 2022-05-20 LAB
C282Y HEMOCHROMATOSIS MUT: NEGATIVE
H63D HEMOCHROMATOSIS MUT: NEGATIVE
HEMOCHROMATOSIS GENE ANALYSIS: NORMAL
HFE PCR SPECIMEN: NORMAL
S65C HEMOCHROMATOSIS MUT: NEGATIVE

## 2022-05-23 ENCOUNTER — OFFICE VISIT (OUTPATIENT)
Dept: PRIMARY CARE CLINIC | Age: 37
End: 2022-05-23
Payer: COMMERCIAL

## 2022-05-23 VITALS
BODY MASS INDEX: 28.5 KG/M2 | HEART RATE: 75 BPM | SYSTOLIC BLOOD PRESSURE: 118 MMHG | DIASTOLIC BLOOD PRESSURE: 62 MMHG | TEMPERATURE: 97.9 F | WEIGHT: 193 LBS | OXYGEN SATURATION: 95 %

## 2022-05-23 DIAGNOSIS — E78.2 MIXED HYPERLIPIDEMIA: ICD-10-CM

## 2022-05-23 DIAGNOSIS — R82.89 ABNORMAL URINE CYTOLOGY: ICD-10-CM

## 2022-05-23 DIAGNOSIS — R31.21 ASYMPTOMATIC MICROSCOPIC HEMATURIA: ICD-10-CM

## 2022-05-23 DIAGNOSIS — R73.03 PREDIABETES: ICD-10-CM

## 2022-05-23 DIAGNOSIS — R79.0 ABNORMAL SERUM IRON LEVEL: ICD-10-CM

## 2022-05-23 DIAGNOSIS — R79.89 ELEVATED LFTS: Primary | ICD-10-CM

## 2022-05-23 DIAGNOSIS — D41.02 NEOPLASM OF UNCERTAIN BEHAVIOR OF LEFT KIDNEY: ICD-10-CM

## 2022-05-23 DIAGNOSIS — Z00.00 HEALTH MAINTENANCE EXAMINATION: ICD-10-CM

## 2022-05-23 PROCEDURE — 99214 OFFICE O/P EST MOD 30 MIN: CPT | Performed by: FAMILY MEDICINE

## 2022-05-23 ASSESSMENT — PATIENT HEALTH QUESTIONNAIRE - PHQ9
SUM OF ALL RESPONSES TO PHQ9 QUESTIONS 1 & 2: 0
SUM OF ALL RESPONSES TO PHQ QUESTIONS 1-9: 0
SUM OF ALL RESPONSES TO PHQ QUESTIONS 1-9: 0
1. LITTLE INTEREST OR PLEASURE IN DOING THINGS: 0
2. FEELING DOWN, DEPRESSED OR HOPELESS: 0
SUM OF ALL RESPONSES TO PHQ QUESTIONS 1-9: 0
SUM OF ALL RESPONSES TO PHQ QUESTIONS 1-9: 0

## 2022-05-23 NOTE — PROGRESS NOTES
Juma Inman : 1985 Sex: male  Age: 42 y.o. Chief Complaint   Patient presents with    1 Month Follow-Up       HPI:      Patient presents today for follow-up. Has been following with GI. Last saw  and follows up in 1 month. Had MRCP negative. Hemochromatosis test negative. Discussed liver biopsy. Discussed second emily [place]. Had previously lost 35 pounds. Feels well. Has not yet heard from urology. We will as well.                 Most Recent Labs  CBC  Lab Results   Component Value Date    WBC 5.1 2022    WBC 5.1 2022    WBC 5.5 2021    RBC 5.54 2022    RBC 5.55 2022    RBC 5.90 2021    HGB 16.1 2022    HGB 16.2 2022    HGB 17.0 2021    HCT 49.7 2022    HCT 48.2 2022    HCT 51.8 2021    MCV 89.7 2022    MCV 86.8 2022    MCV 87.8 2021     2022     2022     2021      CMP  Lab Results   Component Value Date     2022     2022    K 4.8 2022    K 4.4 2022     2022     2022    CO2 26 2022    CO2 27 2022    ANIONGAP 11 2022    GLUCOSE 94 2022    GLUCOSE 93 2022    BUN 20 2022    BUN 16 2022    CREATININE 1.1 2022    CREATININE 1.02 2022    LABGLOM >60 2022    GFRAA >60 2022    CALCIUM 9.7 2022    CALCIUM 9.7 2022    PROT 7.5 2022    PROT 7.1 04/15/2022    PROT 7.5 2022    LABALBU 4.7 2022    LABALBU 4.3 04/15/2022    LABALBU 4.3 2022    BILITOT 0.5 2022    BILITOT 0.8 04/15/2022    BILITOT 0.5 2022    ALKPHOS 164 2022    ALKPHOS 149 04/15/2022    ALKPHOS 142 2022    AST 79 2022    AST 82 04/15/2022    AST 72 2022     2022     04/15/2022     2022     A1C  Lab Results   Component Value Date    LABA1C 5.9 2022    LABA1C 6.0 01/20/2022     TSH  Lab Results   Component Value Date    TSH 1.030 03/01/2022     FREET4  No results found for: [id number]  LIPID  Lab Results   Component Value Date    CHOL 247 03/01/2022    CHOL 268 01/20/2022    HDL 59 03/01/2022    HDL 63 01/20/2022    LDLCALC 156 03/01/2022    LDLCALC 182 01/20/2022    TRIG 161 03/01/2022    TRIG 105 01/20/2022    CHOLHDLRATIO 4.3 01/20/2022     VITAMIN D  No results found for: VITD25  MAGNESIUM  No results found for: MG   PHOS  No results found for: PHOS   THOMPSON   No results found for: THOMPSON  RHEUMATOID FACTOR  No results found for: RF  PSA  No results found for: PSA   HEPATITIS C  Lab Results   Component Value Date    HCVABI Non-Reactive 03/01/2022     HIV  No results found for: [id number], HIV1QT  UA  Lab Results   Component Value Date    COLORU Yellow 03/01/2022    COLORU Yellow 03/02/2021    CLARITYU Clear 03/01/2022    CLARITYU Clear 03/02/2021    GLUCOSEU Negative 03/01/2022    GLUCOSEU Negative 03/02/2021    BILIRUBINUR Negative 03/01/2022    BILIRUBINUR Negative 03/02/2021    KETUA Negative 03/01/2022    KETUA Negative 03/02/2021    SPECGRAV <=1.005 03/01/2022    SPECGRAV 1.020 03/02/2021    BLOODU TRACE-INTACT 03/01/2022    BLOODU Negative 03/02/2021    PHUR 5.5 03/01/2022    PHUR 6.0 03/02/2021    PROTEINU Negative 03/01/2022    PROTEINU Negative 03/02/2021    UROBILINOGEN 0.2 03/01/2022    UROBILINOGEN 0.2 03/02/2021    NITRU Negative 03/01/2022    NITRU Negative 03/02/2021    LEUKOCYTESUR Negative 03/01/2022    LEUKOCYTESUR Negative 03/02/2021     Urine Micro/Albumin Ratio  Lab Results   Component Value Date    MALBCR - 03/01/2022            Review of Systems  ROS:  Const: Denies chills, fever, malaise and sweats. Eyes: Denies discharge, pain, redness and visual disturbance. ENMT: Denies earaches, other ear symptoms. Denies nasal or sinus symptoms other than stated  above. Denies mouth and tongue lesions and sore throat.   CV: Denies chest discomfort, pain; diaphoresis, dizziness, edema, lightheadedness, orthopnea,  palpitations, syncope and near syncopal episode or any exertional symptoms  Resp: Denies cough, hemoptysis, pleuritic pain, SOB, sputum production and wheezing. GI: Denies abdominal pain, change in bowel habits, hematochezia, melena, nausea and vomiting. : Denies urinary symptoms including dysuria , urgency, frequency or hematuria. Musculo: Denies musculoskeletal symptoms. Skin: Denies bruising and rash.   Neuro: Denies headache, numbness, stiff neck, tingling and focal weakness slurred speech or facial  droop  Hema/Lymph: Denies bleeding/bruising tendency and enlarged lymph nodes        Current Outpatient Medications:     Loratadine (CLARITIN PO), Take by mouth, Disp: , Rfl:   Allergies   Allergen Reactions    Cherry Itching       Past Medical History:   Diagnosis Date    Patient denies medical problems      Past Surgical History:   Procedure Laterality Date    BACK SURGERY      Discectomy; Dr Connie Cummins     Family History   Problem Relation Age of Onset    Prostate Cancer Father     Prostate Cancer Maternal Grandfather          age 80, dx 80's    Leukemia Paternal Grandfather         around age [de-identified]     Social History     Tobacco Use    Smoking status: Former Smoker     Packs/day: 0.50     Start date:      Quit date: 2017     Years since quittin.3    Smokeless tobacco: Current User     Types: Chew    Tobacco comment: nicotine pouches   Substance Use Topics    Alcohol use: No    Drug use: No      Social History     Social History Narrative     [employer]    (Gets yrly cxr, bw, occ pfts)          - works [employer]    1 daughter age 3 as of         Vitals:    22 0943   BP: 118/62   Pulse: 75   Temp: 97.9 °F (36.6 °C)   SpO2: 95%   Weight: 193 lb (87.5 kg)      Wt Readings from Last 3 Encounters:   22 193 lb (87.5 kg)   22 194 lb (88 kg)   22 196 lb 1.6 oz (89 kg)        Physical Exam  Exam:  Const: Appears comfortable. No signs of acute distress present. Head/Face: Atraumatic on inspection. Eyes: EOMI in both eyes. No discharge from the eyes. PERRL. Sclerae clear. ENMT: Auditory canals normal. Tympanic membranes: intact and translucent. External nose WNL. Nasal mucosa is clear. Oropharynx: No erythema or exudate. Posterior pharynx is normal.  Neck: Supple. Palpation reveals no adenopathy. No masses appreciated. No JVD. Carotids: no  bruits. Resp: Respirations are unlabored. Clear to auscultation. No rales, rhonchi or wheezes appreciated  over the lungs bilaterally. CV: Rate is regular. Rhythm is regular. No gallop or rubs. No heart murmur appreciated. Extremities: No clubbing, cyanosis, or edema. No calf inflammation or tenderness. Abdomen: Bowel sounds are normoactive. Abdomen is soft, nontender, and nondistended. No  abdominal masses. No palpable hepatosplenomegaly. Lymph: No palpable or visible regional lymphadenopathy. Musculoskeletal: no acute joint inflammation. Skin: Dry and warm with no rash. Skin normal to inspection and palpation overall. Neuro: Alert and oriented. Affect: appropriate. Upper Extremities: 5/5 bilaterally. Lower Extremities:  5/5 bilaterally. Sensation intact to light touch. Reflexes: DTR's are symmetric and 2+ bilaterally. .  Cranial Nerves: Cranial nerves grossly intact. Office Labs This Visit :  No results found for this visit on 05/23/22. Assessment and Plan:   Diagnosis Orders   1. Elevated LFTs     2. Prediabetes     3. Abnormal serum iron level     4. 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 Abnormal urine cytology     7. Mixed hyperlipidemia     8. Health maintenance examination       Health maintenance examination  Health maintenance issues discussed at length4/18/2022 . Encouraged yearly physicals.       Neoplasm of uncertain behavior of left kidney    noted on ultrasound. Previously had abnormal urine cytology at work, repeat normalized. Trace microscopic hematuria. CTU negative, awaiting urologywe will call as he has not yet heard from them. Asymptomatic microscopic hematuria    Counseled extensively. Differential reviewed, including serious etiologies. Ultrasound complete. CTU negative, awaiting urology. Initial cytology abnormal repeat normal    Need for hepatitis A and B vaccination  Hepatitis B  #3 and hepatitis A #2 after September 14, 2022      Abnormal serum iron level    counseled, ordered by and continue per GI. Check ferritin with next blood work. Recommended blood donation in the meantime. Consider hematologydefers now. Hematoma porosis testing through GI negative      Elevated LFTs    Counseled extensively. Differential reviewed, including serious etiologies. Consider fatty liver. Precautions reviewed. Risks of even fatty liverreviewed including cirrhosis. Lifestyle modification and appropriate diet and weight loss reviewed. Asymptomatic. Lifestyle modification. Ultrasound negative. MRCP negative serum iron somewhat elevated. Hemochromatosis blood testing negative. Continue closely per GI. They are checking blood work monthly. Considering liver biopsy. May need second opinion    Prediabetes    counseled, consider insulin sensitizer, lifestyle modification, repeat normalized  With a hemoglobin A1c of 5.9 which is borderline. Mid June sooner if symptoms      Abnormal urine cytology    Counseled extensively. Differential reviewed, including serious etiologies. Prior history of smoking and history of benzene exposure but no family history or other risk factors, asymptomatic and young age. Repeat cytology negative but trace microscopic blood on urinalysis. Ultrasound did reveal Questionable soft tissue 7 mm mass left kidney, CTU negative consultation with Dr. Addi Bosch pending.   Awaiting consultation            Mixed hyperlipidemia    counseled, consider statin which various effects on liver reviewed. However liver function test abnormality needs to be further assessed/treated and LFTs will need to trend down before initiating. Recheck mid June      No flowsheet data found. Plan as above. Counseled extensively and differential diagnoses relevant to above were reviewed, including serious etiologies. Side effects and interactions of medications were reviewed. Counseled. We will call urology to look into delays in that referral, otherwise planning blood work and follow-up in 1 month sooner as needed. As long as symptoms steadily improve/resolve, and medical conditions follow the expected course, FU as below, sooner PRN. Return in about 1 month (around 6/23/2022), or if symptoms worsen or fail to improve. Signs and symptoms to watch for discussed, serious signs and symptoms reviewed. ER if any. Tavia Baez MD    Patients are advised to check with insurance company to ensure coverage and to fully understand benefits and cost prior to any testing. This note was created with the assistance of voice recognition software. Document was reviewed however may contain grammatical errors.

## 2022-06-27 ENCOUNTER — OFFICE VISIT (OUTPATIENT)
Dept: PRIMARY CARE CLINIC | Age: 37
End: 2022-06-27
Payer: COMMERCIAL

## 2022-06-27 VITALS
HEART RATE: 74 BPM | TEMPERATURE: 97.8 F | OXYGEN SATURATION: 95 % | WEIGHT: 195 LBS | SYSTOLIC BLOOD PRESSURE: 122 MMHG | BODY MASS INDEX: 28.8 KG/M2 | DIASTOLIC BLOOD PRESSURE: 78 MMHG

## 2022-06-27 DIAGNOSIS — D41.02 NEOPLASM OF UNCERTAIN BEHAVIOR OF LEFT KIDNEY: ICD-10-CM

## 2022-06-27 DIAGNOSIS — R79.0 ABNORMAL SERUM IRON LEVEL: ICD-10-CM

## 2022-06-27 DIAGNOSIS — R31.21 ASYMPTOMATIC MICROSCOPIC HEMATURIA: ICD-10-CM

## 2022-06-27 DIAGNOSIS — R79.89 ELEVATED LFTS: ICD-10-CM

## 2022-06-27 DIAGNOSIS — R82.89 ABNORMAL URINE CYTOLOGY: ICD-10-CM

## 2022-06-27 DIAGNOSIS — R79.89 ELEVATED LFTS: Primary | ICD-10-CM

## 2022-06-27 DIAGNOSIS — E78.2 MIXED HYPERLIPIDEMIA: ICD-10-CM

## 2022-06-27 DIAGNOSIS — R73.03 PREDIABETES: ICD-10-CM

## 2022-06-27 LAB
ALBUMIN SERPL-MCNC: 4.2 G/DL (ref 3.5–5.2)
ALP BLD-CCNC: 145 U/L (ref 40–129)
ALT SERPL-CCNC: 159 U/L (ref 0–40)
ANION GAP SERPL CALCULATED.3IONS-SCNC: 15 MMOL/L (ref 7–16)
AST SERPL-CCNC: 79 U/L (ref 0–39)
BILIRUB SERPL-MCNC: 0.6 MG/DL (ref 0–1.2)
BILIRUBIN URINE: NEGATIVE
BLOOD, URINE: NEGATIVE
BUN BLDV-MCNC: 17 MG/DL (ref 6–20)
CALCIUM SERPL-MCNC: 9.4 MG/DL (ref 8.6–10.2)
CHLORIDE BLD-SCNC: 104 MMOL/L (ref 98–107)
CHOLESTEROL, TOTAL: 236 MG/DL (ref 0–199)
CLARITY: CLEAR
CO2: 21 MMOL/L (ref 22–29)
COLOR: YELLOW
CREAT SERPL-MCNC: 1 MG/DL (ref 0.7–1.2)
CREATININE URINE: 123 MG/DL (ref 40–278)
FERRITIN: 428 NG/ML
GFR AFRICAN AMERICAN: >60
GFR NON-AFRICAN AMERICAN: >60 ML/MIN/1.73
GLUCOSE BLD-MCNC: 92 MG/DL (ref 74–99)
GLUCOSE URINE: NEGATIVE MG/DL
HBA1C MFR BLD: 5.6 % (ref 4–5.6)
HDLC SERPL-MCNC: 64 MG/DL
KETONES, URINE: NEGATIVE MG/DL
LDL CHOLESTEROL CALCULATED: 155 MG/DL (ref 0–99)
LEUKOCYTE ESTERASE, URINE: NEGATIVE
MICROALBUMIN UR-MCNC: <12 MG/L
MICROALBUMIN/CREAT UR-RTO: ABNORMAL (ref 0–30)
NITRITE, URINE: NEGATIVE
PH UA: 5.5 (ref 5–9)
POTASSIUM SERPL-SCNC: 5 MMOL/L (ref 3.5–5)
PROTEIN UA: NEGATIVE MG/DL
SODIUM BLD-SCNC: 140 MMOL/L (ref 132–146)
SPECIFIC GRAVITY UA: >=1.03 (ref 1–1.03)
TOTAL CK: 58 U/L (ref 20–200)
TOTAL PROTEIN: 7.2 G/DL (ref 6.4–8.3)
TRIGL SERPL-MCNC: 86 MG/DL (ref 0–149)
UROBILINOGEN, URINE: 0.2 E.U./DL
VLDLC SERPL CALC-MCNC: 17 MG/DL

## 2022-06-27 PROCEDURE — 99213 OFFICE O/P EST LOW 20 MIN: CPT | Performed by: FAMILY MEDICINE

## 2022-06-27 NOTE — PROGRESS NOTES
Michelle Syed : 1985 Sex: male  Age: 42 y.o. Chief Complaint   Patient presents with    1 Month Follow-Up       HPI:      Patient presents today for follow-up. Has been following with GI. Also saw urology and they felt everything was good he states with no follow-up recommended. As far as GI, he had an MRCP that was negative, hemochromatosis test negative, they discussed a liver biopsy. Discussed second opinion [place]. Had previously lost 35 pounds. Feels well. Did not get blood work yet.             Most Recent Labs  CBC  Lab Results   Component Value Date    WBC 5.1 2022    WBC 5.1 2022    WBC 5.5 2021    RBC 5.54 2022    RBC 5.55 2022    RBC 5.90 2021    HGB 16.1 2022    HGB 16.2 2022    HGB 17.0 2021    HCT 49.7 2022    HCT 48.2 2022    HCT 51.8 2021    MCV 89.7 2022    MCV 86.8 2022    MCV 87.8 2021     2022     2022     2021      CMP  Lab Results   Component Value Date     2022     2022    K 4.8 2022    K 4.4 2022     2022     2022    CO2 26 2022    CO2 27 2022    ANIONGAP 11 2022    GLUCOSE 94 2022    GLUCOSE 93 2022    BUN 20 2022    BUN 16 2022    CREATININE 1.1 2022    CREATININE 1.02 2022    LABGLOM >60 2022    GFRAA >60 2022    CALCIUM 9.7 2022    CALCIUM 9.7 2022    PROT 7.5 2022    PROT 7.1 04/15/2022    PROT 7.5 2022    LABALBU 4.7 2022    LABALBU 4.3 04/15/2022    LABALBU 4.3 2022    BILITOT 0.5 2022    BILITOT 0.8 04/15/2022    BILITOT 0.5 2022    ALKPHOS 164 2022    ALKPHOS 149 04/15/2022    ALKPHOS 142 2022    AST 79 2022    AST 82 04/15/2022    AST 72 2022     2022     04/15/2022     2022     A1C  Lab Results Component Value Date    LABA1C 5.9 03/01/2022    LABA1C 6.0 01/20/2022     TSH  Lab Results   Component Value Date    TSH 1.030 03/01/2022     FREET4  No results found for: F1RAHKA  LIPID  Lab Results   Component Value Date    CHOL 247 03/01/2022    CHOL 268 01/20/2022    HDL 59 03/01/2022    HDL 63 01/20/2022    LDLCALC 156 03/01/2022    LDLCALC 182 01/20/2022    TRIG 161 03/01/2022    TRIG 105 01/20/2022    CHOLHDLRATIO 4.3 01/20/2022     VITAMIN D  No results found for: VITD25  MAGNESIUM  No results found for: MG   PHOS  No results found for: PHOS   THOMPSON   No results found for: THOMPSON  RHEUMATOID FACTOR  No results found for: RF  PSA  No results found for: PSA   HEPATITIS C  Lab Results   Component Value Date    HCVABI Non-Reactive 03/01/2022     HIV  No results found for: [id number], HIV1QT  UA  Lab Results   Component Value Date    COLORU Yellow 03/01/2022    COLORU Yellow 03/02/2021    CLARITYU Clear 03/01/2022    CLARITYU Clear 03/02/2021    GLUCOSEU Negative 03/01/2022    GLUCOSEU Negative 03/02/2021    BILIRUBINUR Negative 03/01/2022    BILIRUBINUR Negative 03/02/2021    KETUA Negative 03/01/2022    KETUA Negative 03/02/2021    SPECGRAV <=1.005 03/01/2022    SPECGRAV 1.020 03/02/2021    BLOODU TRACE-INTACT 03/01/2022    BLOODU Negative 03/02/2021    PHUR 5.5 03/01/2022    PHUR 6.0 03/02/2021    PROTEINU Negative 03/01/2022    PROTEINU Negative 03/02/2021    UROBILINOGEN 0.2 03/01/2022    UROBILINOGEN 0.2 03/02/2021    NITRU Negative 03/01/2022    NITRU Negative 03/02/2021    LEUKOCYTESUR Negative 03/01/2022    LEUKOCYTESUR Negative 03/02/2021     Urine Micro/Albumin Ratio  Lab Results   Component Value Date    MALBCR - 03/01/2022            Review of Systems  ROS:  Const: Denies chills, fever, malaise and sweats. Eyes: Denies discharge, pain, redness and visual disturbance. ENMT: Denies earaches, other ear symptoms. Denies nasal or sinus symptoms other than stated  above.  Denies mouth and tongue lesions and 193 lb (87.5 kg)   05/19/22 194 lb (88 kg)        Physical Exam  Exam:  Const: Appears comfortable. No signs of acute distress present. Head/Face: Atraumatic on inspection. Eyes: EOMI in both eyes. No discharge from the eyes. PERRL. Sclerae clear. ENMT: Auditory canals normal. Tympanic membranes: intact and translucent. External nose WNL. Nasal mucosa is clear. Oropharynx: No erythema or exudate. Posterior pharynx is normal.  Neck: Supple. Palpation reveals no adenopathy. No masses appreciated. No JVD. Carotids: no  bruits. Resp: Respirations are unlabored. Clear to auscultation. No rales, rhonchi or wheezes appreciated  over the lungs bilaterally. CV: Rate is regular. Rhythm is regular. No gallop or rubs. No heart murmur appreciated. Extremities: No clubbing, cyanosis, or edema. No calf inflammation or tenderness. Abdomen: Bowel sounds are normoactive. Abdomen is soft, nontender, and nondistended. No  abdominal masses. No palpable hepatosplenomegaly. Lymph: No palpable or visible regional lymphadenopathy. Musculoskeletal: no acute joint inflammation. Skin: Dry and warm with no rash. Skin normal to inspection and palpation overall. Neuro: Alert and oriented. Affect: appropriate. Upper Extremities: 5/5 bilaterally. Lower Extremities:  5/5 bilaterally. Sensation intact to light touch. Reflexes: DTR's are symmetric and 2+ bilaterally. .  Cranial Nerves: Cranial nerves grossly intact. Office Labs This Visit :  No results found for this visit on 06/27/22. Assessment and Plan:   Diagnosis Orders   1. Elevated LFTs     2. Prediabetes     3. Abnormal serum iron level     4. 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 Mixed hyperlipidemia     7. Abnormal urine cytology         Health maintenance examination  Health maintenance issues discussed at length 4/18/2022 . Encouraged yearly physicals.   Due for hepatitis B #3 after September 14, 2022      Neoplasm of uncertain behavior of left kidney    noted on ultrasound. Previously had abnormal urine cytology at work, repeat normalized. Trace microscopic hematuria. Repeat normalized at urologist.  CTU negative. Saw urology and did not feel any abnormalities. Asymptomatic microscopic hematuria    Counseled extensively. Differential reviewed, including serious etiologies. Ultrasound complete. CTU negative,  Initial cytology abnormal repeat normal and repeat UA negative at urologist.  Evaluation urologist negative. Need for hepatitis A and B vaccination  Hepatitis B  #3 and hepatitis A #2 after September 14, 2022      Abnormal serum iron level    counseled, ordered by and continue per GI. Awaiting ferritin with next blood work. Recommended blood donation in the meantime. Consider hematologydefers now. Hematoma porosis testing through GI negative      Elevated LFTs    Counseled extensively. Differential reviewed, including serious etiologies. Consider fatty liver. Precautions reviewed. Risks of even fatty liverreviewed including cirrhosis. Lifestyle modification and appropriate diet and weight loss reviewed. Asymptomatic. Lifestyle modification. Ultrasound negative. MRCP negative serum iron somewhat elevated. Hemochromatosis blood testing negative. Continue closely per GI. They are checking blood work monthly. Considering liver biopsy. May need second opinion    Prediabetes    counseled, consider insulin sensitizer, lifestyle modification, repeat normalized  With a hemoglobin A1c of 5.9 which is borderline. Awaiting repeat blood work      Abnormal urine cytology    Counseled extensively. Differential reviewed, including serious etiologies. Prior history of smoking and history of benzene exposure but no family history or other risk factors, asymptomatic and young age. Repeat cytology negative but trace microscopic blood on urinalysis. Repeat urinalysis at urologist negative. Ultrasound did reveal Questionable soft tissue 7 mm mass left kidney, CTU negative consultation with Dr. Helena Arauz negative          Mixed hyperlipidemia    counseled, consider statin which various effects on liver reviewed. However liver function test abnormality needs to be further assessed/treated and LFTs will need to trend down before initiating. Await repeat    No flowsheet data found. Plan as above. Counseled extensively and differential diagnoses relevant to above were reviewed, including serious etiologies, risks and complications, especially of left uncontrolled. If relevant, instructions and  alternatives to meds/treatment reviewed, as well as interactions, and  SE's/ADRs reviewed, notify immediately if any, discontinuing new meds if any. Plan made after discussion and shared decision making. Await repeat blood work. He is going to go today, follow-up Monday virtually to review sooner as needed      As long as symptoms steadily improve/resolve, and medical conditions follow the expected course, FU as below, sooner PRN. Return for virtual fu mon. Educational materials and/or home exercises printed for patient's review and were included in patient instructions on his/her After Visit Summary and given to patient at the end of visit. After discussion, patient and/or guardian verbalizes understanding, agrees, feels comfortable with and wishes to proceed with above treatment plan. Call for any pending results, FU sooner if abnormal, as needed or if any current symptoms persist/worsen. Advised patient to call with any new medication issues, and read all Rx info from pharmacy to assure aware of all possible risks and side effects of medication before taking. Reviewed age and gender appropriate health screening exams and vaccinations.   Advised patient regarding importance of keeping up with recommended health maintenance and to schedule as soon as possible if overdue, as this is important in assessing for undiagnosed pathology, especially cancer, as well as protecting against potentially harmful/life threatening disease. Patient and/or guardian verbalizes understanding and agrees with above counseling, assessment and plan. All questions answered. Signs and symptoms to watch for discussed, serious signs and symptoms reviewed. ER if any. Janak Posey MD    Patients are advised to check with insurance company to ensure coverage and to fully understand benefits and cost prior to any testing. This note was created with the assistance of voice recognition software. Document was reviewed however may contain grammatical errors.

## 2022-07-05 ENCOUNTER — TELEMEDICINE (OUTPATIENT)
Dept: PRIMARY CARE CLINIC | Age: 37
End: 2022-07-05
Payer: COMMERCIAL

## 2022-07-05 DIAGNOSIS — Z23 NEED FOR HEPATITIS A AND B VACCINATION: ICD-10-CM

## 2022-07-05 DIAGNOSIS — R73.03 PREDIABETES: ICD-10-CM

## 2022-07-05 DIAGNOSIS — Z00.00 HEALTH MAINTENANCE EXAMINATION: ICD-10-CM

## 2022-07-05 DIAGNOSIS — D41.02 NEOPLASM OF UNCERTAIN BEHAVIOR OF LEFT KIDNEY: ICD-10-CM

## 2022-07-05 DIAGNOSIS — R79.0 ABNORMAL SERUM IRON LEVEL: Primary | ICD-10-CM

## 2022-07-05 DIAGNOSIS — R79.89 ELEVATED LFTS: ICD-10-CM

## 2022-07-05 DIAGNOSIS — R31.21 ASYMPTOMATIC MICROSCOPIC HEMATURIA: ICD-10-CM

## 2022-07-05 DIAGNOSIS — E78.2 MIXED HYPERLIPIDEMIA: ICD-10-CM

## 2022-07-05 PROCEDURE — 99214 OFFICE O/P EST MOD 30 MIN: CPT | Performed by: FAMILY MEDICINE

## 2022-07-05 NOTE — PROGRESS NOTES
TeleMedicine Patient Consent    This visit was performed as a virtual video visit using a synchronous, two-way, audio-video telehealth technology platform. Patient identification was verified at the start of the visit, including the patient's telephone number and physical location. I discussed with the patient the nature of our telehealth visits, that:     1. Due to the nature of an audio- video modality, the only components of a physical exam that could be done are the elements supported by direct observation. 2. I would evaluate the patient and recommend diagnostics and treatments based on my assessment. 3. If it was felt that the patient should be evaluated in clinic or an emergency room setting, then they would be directed there. 4. Our sessions are not being recorded and that personal health information is protected. 5. Our team would provide follow up care in person if/when the patient needs it. Patient does agree to proceed with telemedicine consultation. Patient's location: home address in [facility]    Physician  location other address in [place]     Other people involved in call:   None    This visit was completed virtually using D[employer]. me    2022    TELEHEALTH EVALUATION -- Audio/Visual (During [id number] public health emergency)    Chief Complaint   Patient presents with    Discuss Labs           HPI:    Shay Coe (:  1985) has requested an audio/video evaluation for the following concern(s):    Patient presents today for follow-up of his blood work. Generally feels well. He was supposed to hear back from Dr. Jeanetta Hammans office, states the [facility] was going to speak with Dr. Nato Esqueda when he returned from a conference and he has not heard back. He will follow-up with them.   Reviewed past imaging and past blood work as well as current blood work    Current blood work shows CO2 21 alkaline phosphatase 145  AST 79  HDL 64 triglyceride 86 hemoglobin A1c 5.6 with glucose 92 ferritin 428 urinalysis negative    The ASCVD Risk score (Maegan Diaz et al., 2013) failed to calculate for the following reasons:     The 2013 ASCVD risk score is only valid for ages 36 to 78  Most Recent Labs  CBC  Lab Results   Component Value Date/Time    WBC 5.1 03/08/2022 07:59 AM    WBC 5.1 01/20/2022 12:00 AM    WBC 5.5 03/02/2021 10:30 AM    RBC 5.54 03/08/2022 07:59 AM    RBC 5.55 01/20/2022 12:00 AM    RBC 5.90 03/02/2021 10:30 AM    HGB 16.1 03/08/2022 07:59 AM    HGB 16.2 01/20/2022 12:00 AM    HGB 17.0 03/02/2021 10:30 AM    HCT 49.7 03/08/2022 07:59 AM    HCT 48.2 01/20/2022 12:00 AM    HCT 51.8 03/02/2021 10:30 AM    MCV 89.7 03/08/2022 07:59 AM    MCV 86.8 01/20/2022 12:00 AM    MCV 87.8 03/02/2021 10:30 AM     03/08/2022 07:59 AM     01/20/2022 12:00 AM     03/02/2021 10:30 AM      CMP  Lab Results   Component Value Date/Time     06/27/2022 09:42 AM     03/01/2022 09:16 AM     01/20/2022 12:00 AM    K 5.0 06/27/2022 09:42 AM    K 4.8 03/01/2022 09:16 AM    K 4.4 01/20/2022 12:00 AM     06/27/2022 09:42 AM     03/01/2022 09:16 AM     01/20/2022 12:00 AM    CO2 21 06/27/2022 09:42 AM    CO2 26 03/01/2022 09:16 AM    CO2 27 01/20/2022 12:00 AM    ANIONGAP 15 06/27/2022 09:42 AM    ANIONGAP 11 03/01/2022 09:16 AM    GLUCOSE 92 06/27/2022 09:42 AM    GLUCOSE 94 03/01/2022 09:16 AM    GLUCOSE 93 01/20/2022 12:00 AM    BUN 17 06/27/2022 09:42 AM    BUN 20 03/01/2022 09:16 AM    BUN 16 01/20/2022 12:00 AM    CREATININE 1.0 06/27/2022 09:42 AM    CREATININE 1.1 03/01/2022 09:16 AM    CREATININE 1.02 01/20/2022 12:00 AM    LABGLOM >60 06/27/2022 09:42 AM    LABGLOM >60 03/01/2022 09:16 AM    GFRAA >60 06/27/2022 09:42 AM    GFRAA >60 03/01/2022 09:16 AM    CALCIUM 9.4 06/27/2022 09:42 AM    CALCIUM 9.7 03/01/2022 09:16 AM    CALCIUM 9.7 01/20/2022 12:00 AM    PROT 7.2 06/27/2022 09:42 AM    PROT 7.5 05/12/2022 03:32 PM    PROT 7.1 04/15/2022 09:28 AM    LABALBU 4.2 06/27/2022 09:42 AM    LABALBU 4.7 05/12/2022 03:32 PM    LABALBU 4.3 04/15/2022 09:28 AM    BILITOT 0.6 06/27/2022 09:42 AM    BILITOT 0.5 05/12/2022 03:32 PM    BILITOT 0.8 04/15/2022 09:28 AM    ALKPHOS 145 06/27/2022 09:42 AM    ALKPHOS 164 05/12/2022 03:32 PM    ALKPHOS 149 04/15/2022 09:28 AM    AST 79 06/27/2022 09:42 AM    AST 79 05/12/2022 03:32 PM    AST 82 04/15/2022 09:28 AM     06/27/2022 09:42 AM     05/12/2022 03:32 PM     04/15/2022 09:28 AM     A1C  Lab Results   Component Value Date/Time    LABA1C 5.6 06/27/2022 09:42 AM    LABA1C 5.9 03/01/2022 09:16 AM    LABA1C 6.0 01/20/2022 12:00 AM     TSH  Lab Results   Component Value Date/Time    TSH 1.030 03/01/2022 09:16 AM     FREET4  No results found for: [id number]  LIPID  Lab Results   Component Value Date/Time    CHOL 236 06/27/2022 09:42 AM    CHOL 247 03/01/2022 09:16 AM    CHOL 268 01/20/2022 12:00 AM    HDL 64 06/27/2022 09:42 AM    HDL 59 03/01/2022 09:16 AM    HDL 63 01/20/2022 12:00 AM    LDLCALC 155 06/27/2022 09:42 AM    LDLCALC 156 03/01/2022 09:16 AM    LDLCALC 182 01/20/2022 12:00 AM    TRIG 86 06/27/2022 09:42 AM    TRIG 161 03/01/2022 09:16 AM    TRIG 105 01/20/2022 12:00 AM    CHOLHDLRATIO 4.3 01/20/2022 12:00 AM     VITAMIN D  No results found for: VITD25  MAGNESIUM  No results found for: MG   PHOS  No results found for: PHOS   THOMPSON   No results found for: THOMPSON  RHEUMATOID FACTOR  No results found for: RF  PSA  No results found for: PSA   HEPATITIS C  Lab Results   Component Value Date/Time    HCVABI Non-Reactive 03/01/2022 09:16 AM     HIV  No results found for: [id number], HIV1QT  UA  Lab Results   Component Value Date/Time    COLORU Yellow 06/27/2022 09:40 AM    COLORU Yellow 03/01/2022 09:15 AM    COLORU Yellow 03/02/2021 11:00 AM    CLARITYU Clear 06/27/2022 09:40 AM    CLARITYU Clear 03/01/2022 09:15 AM    CLARITYU Clear 03/02/2021 11:00 AM    GLUCOSEU Negative 06/27/2022 09:40 AM    GLUCOSEU Negative 03/01/2022 09:15 AM    GLUCOSEU Negative 03/02/2021 11:00 AM    BILIRUBINUR Negative 06/27/2022 09:40 AM    BILIRUBINUR Negative 03/01/2022 09:15 AM    BILIRUBINUR Negative 03/02/2021 11:00 AM    KETUA Negative 06/27/2022 09:40 AM    KETUA Negative 03/01/2022 09:15 AM    KETUA Negative 03/02/2021 11:00 AM    SPECGRAV >=1.030 06/27/2022 09:40 AM    SPECGRAV <=1.005 03/01/2022 09:15 AM    SPECGRAV 1.020 03/02/2021 11:00 AM    BLOODU Negative 06/27/2022 09:40 AM    BLOODU TRACE-INTACT 03/01/2022 09:15 AM    BLOODU Negative 03/02/2021 11:00 AM    PHUR 5.5 06/27/2022 09:40 AM    PHUR 5.5 03/01/2022 09:15 AM    PHUR 6.0 03/02/2021 11:00 AM    PROTEINU Negative 06/27/2022 09:40 AM    PROTEINU Negative 03/01/2022 09:15 AM    PROTEINU Negative 03/02/2021 11:00 AM    UROBILINOGEN 0.2 06/27/2022 09:40 AM    UROBILINOGEN 0.2 03/01/2022 09:15 AM    UROBILINOGEN 0.2 03/02/2021 11:00 AM    NITRU Negative 06/27/2022 09:40 AM    NITRU Negative 03/01/2022 09:15 AM    NITRU Negative 03/02/2021 11:00 AM    LEUKOCYTESUR Negative 06/27/2022 09:40 AM    LEUKOCYTESUR Negative 03/01/2022 09:15 AM    LEUKOCYTESUR Negative 03/02/2021 11:00 AM     Urine Micro/Albumin Ratio  Lab Results   Component Value Date/Time    MALBCR - 06/27/2022 09:40 AM    MALBCR - 03/01/2022 09:15 AM         ROS:  Const: Denies chills, fever, malaise and sweats. Eyes: Denies discharge, pain, redness and visual disturbance. ENMT: Denies earaches, other ear symptoms. Denies nasal or sinus symptoms other than stated  above. Denies mouth and tongue lesions and sore throat. CV: Denies chest discomfort, pain; diaphoresis, dizziness, edema, lightheadedness, orthopnea,  palpitations, syncope and near syncopal episode or any exertional symptoms  Resp: Denies cough, hemoptysis, pleuritic pain, SOB, sputum production and wheezing. GI: Denies abdominal pain, change in bowel habits, hematochezia, melena, nausea and vomiting.   : Denies urinary symptoms including dysuria , urgency, frequency or hematuria. Musculo: Denies musculoskeletal symptoms. Skin: Denies bruising and rash. Neuro: Denies headache, numbness, stiff neck, tingling and focal weakness slurred speech or facial  droop  Hema/Lymph: Denies bleeding/bruising tendency and enlarged lymph nodes         Current Outpatient Medications:     Loratadine (CLARITIN PO), Take by mouth, Disp: , Rfl:   Allergies   Allergen Reactions    Cherry Itching       Past Medical History:   Diagnosis Date    Patient denies medical problems      Past Surgical History:   Procedure Laterality Date    BACK SURGERY  2010    Discectomy; Dr Christian Florian     Family History   Problem Relation Age of Onset    Prostate Cancer Father     Prostate Cancer Maternal Grandfather          age 80, dx 80's    Leukemia Paternal Grandfather         around age [de-identified]     Social History     Tobacco Use    Smoking status: Former Smoker     Packs/day: 0.50     Start date:      Quit date:      Years since quittin.5    Smokeless tobacco: Current User     Types: Chew    Tobacco comment: nicotine pouches   Substance Use Topics    Alcohol use: No    Drug use: No     Social History     Social History Narrative     [employer]    (Gets yrly cxr, bw, occ pfts)          - works [employer]    1 daughter age 3 as of                  PHYSICAL EXAMINATION:  [ Cleatus Octavio:  \"[x]\" Indicates a positive item  \"[]\" Indicates a negative item  -- DELETE ALL ITEMS NOT EXAMINED]  Vital Signs: (As obtained by patient/caregiver or practitioner observation)    There were no vitals filed for this visit.       Blood pressure-  Heart rate-    Respiratory rate-    Temperature-  Pulse oximetry-     Constitutional: [x] Appears well-developed and well-nourished [x] No apparent distress      [] Abnormal-   Mental status  [x] Alert and awake  [x] Oriented to person/place/time [x]Able to follow commands      Eyes:  EOM    [x]  Normal  [] Abnormal-  Sclera  [x]  Normal  [] Abnormal -         Discharge [x]  None visible  [] Abnormal -    HENT:   [x] Normocephalic, atraumatic. [] Abnormal   [x] Mouth/Throat: Mucous membranes are moist.     External Ears [x] Normal  [] Abnormal-     Neck: [x] No visualized mass     Pulmonary/Chest: [x] Respiratory effort normal.  [x] No visualized signs of difficulty breathing or respiratory distress        [] Abnormal-      Musculoskeletal:   [x] Normal gait with no signs of ataxia         [x] Normal range of motion of neck        [] Abnormal-       Neurological:        [x] No Facial Asymmetry (Cranial nerve 7 motor function) (limited exam to video visit)          [x] No gaze palsy        [] Abnormal-         Skin:        [x] No significant exanthematous lesions or discoloration noted on facial skin         [] Abnormal-            Psychiatric:       [x] Normal Affect [x] No Hallucinations        [] Abnormal-     Other pertinent observable physical exam findings-     ASSESSMENT/PLAN:   Diagnosis Orders   1. Abnormal serum iron level  Amb External Referral To Hematology Oncology    CBC with Auto Differential    Ferritin   2. Elevated LFTs  Amb External Referral To Hematology Oncology    CBC with Auto Differential    Comprehensive Metabolic Panel   3. Prediabetes     4. Asymptomatic microscopic hematuria  Urinalysis   5. 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 Health maintenance examination     8. Need for hepatitis A and B vaccination         No problem-specific Assessment & Plan notes found for this encounter. Health maintenance examination  Health maintenance issues discussed at length 4/18/2022 . Encouraged yearly physicals. Due for hepatitis B #3 after September 14, 2022        Neoplasm of uncertain behavior of left kidney    noted on ultrasound. Previously had abnormal urine cytology at work, repeat normalized. Trace microscopic hematuria.   Repeat normalized at urologist.  CTU negative. Saw urology and did not feel any abnormalities.     Asymptomatic microscopic hematuria    Counseled extensively. Differential reviewed, including serious etiologies. Ultrasound complete. CTU negative,  Initial cytology abnormal repeat normal and repeat UA negative at urologist.  Evaluation urologist negative.     Need for hepatitis A and B vaccination  Hepatitis B  #3 and hepatitis A #2 after September 14, 2022        Abnormal serum iron level    counseled, ordered by and continue per GI. Hemochromatosis testing negative. However liver enzymes remain elevated, ferritin elevated, H/H borderline-refer to hematology    Elevated LFTs    Counseled extensively. Differential reviewed, including serious etiologies. Consider fatty liver. Precautions reviewed. Risks of even fatty liver reviewed including cirrhosis. Lifestyle modification and appropriate diet and weight loss reviewed. Asymptomatic. Lifestyle modification. Ultrasound negative. MRCP negative serum iron somewhat elevated. Hemochromatosis blood testing negative. Continue closely per GI. They are checking blood work monthly. Considering liver biopsy. May need second opinion [facility] [facility] [facility]. He will discuss with them ultrasound elastography. He will call and follow-up with him.     Prediabetes    counseled, consider insulin sensitizer, lifestyle modification, repeat normalized  With a hemoglobin A1c of 5.9 which was borderline, however repeat 5.6 with glucose 92. Monitor glucose periodically   Abnormal urine cytology    Counseled extensively. Differential reviewed, including serious etiologies. Prior history of smoking and history of benzene exposure but no family history or other risk factors, asymptomatic and young age. Repeat cytology negative but trace microscopic blood on urinalysis. Repeat urinalysis at urologist negative.   Ultrasound did reveal Questionable soft tissue 7 mm mass left kidney, CTU negative consultation with Dr. Mckeon Males negative              Mixed hyperlipidemia    counseled, consider statin which various effects on liver reviewed. However, with liver function test abnormality needs to be further assessed/treated and LFTs will need to trend down before initiating. Monitor periodically. Pros and cons of meds reviewed, risk of hyperlipidemia reviewed    Plan as above. Counseled extensively and differential diagnoses relevant to above were reviewed, including serious etiologies, risks and complications, especially of left uncontrolled. If relevant, instructions and  alternatives to meds/treatment reviewed, as well as interactions, and  SE's/ADRs reviewed, notify immediately if any, discontinuing new meds if any. Plan made after discussion and shared decision making. Continue per specialist, he will call and follow-up with GI. Refer to hematology. Due for hepatitis A #2 after September 14, he is going to follow-up around that time with blood work sooner as needed. As long as symptoms steadily improve/resolve and medical conditions are following the expected course, FU as below, sooner PRN      Return in about 2 months (around 9/15/2022). Educational materials and/or home exercises printed for patient's review and were included in patient instructions on his/her After Visit Summary and given to patient at the end of visit. After discussion, patient and/or guardian verbalizes understanding, agrees, feels comfortable with and wishes to proceed with above treatment plan. Call for any pending results, FU sooner if abnormal, as needed or if any current symptoms persist/worsen. Advised patient to call with any new medication issues, and read all Rx info from pharmacy to assure aware of all possible risks and side effects of medication before taking. Reviewed age and gender appropriate health screening exams and vaccinations.   Advised patient regarding importance of keeping up with recommended health maintenance and to schedule as soon as possible if overdue, as this is important in assessing for undiagnosed pathology, especially cancer, as well as protecting against potentially harmful/life threatening disease. Patient and/or guardian verbalizes understanding and agrees with above counseling, assessment and plan. All questions answered. Signs and symptoms to watch for discussed, serious signs and symptoms reviewed. ER if any. Time spent: Greater than Not billed by jaciel Ramos, was evaluated through a synchronous (real-time) audio-video encounter. The patient (or guardian if applicable) is aware that this is a billable service, which includes applicable co-pays. This Virtual Visit was conducted with patient's (and/or legal guardian's) consent. The visit was conducted pursuant to the emergency declaration under the [address] [place] [address], [address] [place] [address] [facility] authority and the [employer] General Act. Patient identification was verified, and a caregiver was present when appropriate. The patient was located in a state where the provider was licensed to provide care. The patient (and/or legal guardian) has also been advised to contact this office for worsening conditions or problems, and seek emergency medical treatment and/or call 911 if deemed necessary. As this was a virtual encounter, patient (and /or legal guardian) was instructed on various ways to be seen in person. Patients are advised to check with insurance company to ensure coverage and to fully understand benefits and cost prior to any testing to try to avoid unexpected charges. This note was created with the assistance of voice recognition software. Inadvertent errors may be present. Signs and symptoms to watch for were discussed. Serious signs and symptoms reviewed.  ER if any    --Tess LIEBERMAN René Nicole MD on 7/5/2022 at 11:12 AM    An electronic signature was used to authenticate this note.